# Patient Record
Sex: MALE | Race: WHITE | Employment: OTHER | URBAN - NONMETROPOLITAN AREA
[De-identification: names, ages, dates, MRNs, and addresses within clinical notes are randomized per-mention and may not be internally consistent; named-entity substitution may affect disease eponyms.]

---

## 2017-02-23 ENCOUNTER — PROCEDURE VISIT (OUTPATIENT)
Dept: CARDIOLOGY | Age: 62
End: 2017-02-23

## 2017-02-23 DIAGNOSIS — Z95.0 PACEMAKER: Primary | ICD-10-CM

## 2017-02-23 PROCEDURE — 93294 REM INTERROG EVL PM/LDLS PM: CPT | Performed by: INTERNAL MEDICINE

## 2017-02-23 PROCEDURE — 93296 REM INTERROG EVL PM/IDS: CPT | Performed by: INTERNAL MEDICINE

## 2017-03-13 RX ORDER — VALSARTAN 80 MG/1
TABLET ORAL
Qty: 90 TABLET | Refills: 4 | Status: SHIPPED | OUTPATIENT
Start: 2017-03-13 | End: 2018-03-21 | Stop reason: SDUPTHER

## 2017-05-02 ENCOUNTER — PROCEDURE VISIT (OUTPATIENT)
Dept: CARDIOLOGY | Age: 62
End: 2017-05-02

## 2017-05-02 ENCOUNTER — OFFICE VISIT (OUTPATIENT)
Dept: CARDIOLOGY | Age: 62
End: 2017-05-02

## 2017-05-02 VITALS
WEIGHT: 255.8 LBS | BODY MASS INDEX: 33.9 KG/M2 | DIASTOLIC BLOOD PRESSURE: 82 MMHG | HEIGHT: 73 IN | SYSTOLIC BLOOD PRESSURE: 126 MMHG | HEART RATE: 66 BPM

## 2017-05-02 DIAGNOSIS — Z82.49 FH: MITRAL VALVE REPAIR: ICD-10-CM

## 2017-05-02 DIAGNOSIS — Z95.0 PACEMAKER: ICD-10-CM

## 2017-05-02 DIAGNOSIS — I10 ESSENTIAL HYPERTENSION: Primary | ICD-10-CM

## 2017-05-02 DIAGNOSIS — I48.0 PAROXYSMAL ATRIAL FIBRILLATION (HCC): ICD-10-CM

## 2017-05-02 DIAGNOSIS — Z95.0 PACEMAKER: Primary | ICD-10-CM

## 2017-05-02 PROCEDURE — 1036F TOBACCO NON-USER: CPT | Performed by: NUCLEAR MEDICINE

## 2017-05-02 PROCEDURE — 93280 PM DEVICE PROGR EVAL DUAL: CPT | Performed by: INTERNAL MEDICINE

## 2017-05-02 PROCEDURE — 3017F COLORECTAL CA SCREEN DOC REV: CPT | Performed by: NUCLEAR MEDICINE

## 2017-05-02 PROCEDURE — G8427 DOCREV CUR MEDS BY ELIG CLIN: HCPCS | Performed by: NUCLEAR MEDICINE

## 2017-05-02 PROCEDURE — 99213 OFFICE O/P EST LOW 20 MIN: CPT | Performed by: NUCLEAR MEDICINE

## 2017-05-02 PROCEDURE — G8419 CALC BMI OUT NRM PARAM NOF/U: HCPCS | Performed by: NUCLEAR MEDICINE

## 2017-05-02 RX ORDER — MELOXICAM 15 MG/1
15 TABLET ORAL PRN
COMMUNITY
End: 2018-05-01 | Stop reason: ALTCHOICE

## 2017-08-10 ENCOUNTER — PROCEDURE VISIT (OUTPATIENT)
Dept: CARDIOLOGY CLINIC | Age: 62
End: 2017-08-10
Payer: COMMERCIAL

## 2017-08-10 DIAGNOSIS — Z95.0 PACEMAKER: Primary | ICD-10-CM

## 2017-08-10 PROCEDURE — 93296 REM INTERROG EVL PM/IDS: CPT | Performed by: INTERNAL MEDICINE

## 2017-08-10 PROCEDURE — 93294 REM INTERROG EVL PM/LDLS PM: CPT | Performed by: INTERNAL MEDICINE

## 2017-11-15 ENCOUNTER — PROCEDURE VISIT (OUTPATIENT)
Dept: CARDIOLOGY CLINIC | Age: 62
End: 2017-11-15
Payer: COMMERCIAL

## 2017-11-15 DIAGNOSIS — Z95.0 PACEMAKER: Primary | ICD-10-CM

## 2017-11-15 PROCEDURE — 93294 REM INTERROG EVL PM/LDLS PM: CPT | Performed by: INTERNAL MEDICINE

## 2017-11-15 PROCEDURE — 93296 REM INTERROG EVL PM/IDS: CPT | Performed by: INTERNAL MEDICINE

## 2017-11-15 NOTE — PROGRESS NOTES
DR Jennifer Ortiz PT/PAF/ MG    [employer] DUAL PACEMAKER  BATTERY 6 YRS REMAINING ON DEVICE  ATRIAL IMPEDENCE 557  RV IMPEDENCE 523  P WAVES 7.5  RV WAVES 15.1  A PACED 12%  V PACED 0%   EPISODES OF NS VT  SEVERAL EPISODES OF ATRIAL FAST RATE

## 2018-03-08 ENCOUNTER — TELEPHONE (OUTPATIENT)
Dept: CARDIOLOGY CLINIC | Age: 63
End: 2018-03-08

## 2018-03-09 ENCOUNTER — PROCEDURE VISIT (OUTPATIENT)
Dept: CARDIOLOGY CLINIC | Age: 63
End: 2018-03-09

## 2018-03-09 DIAGNOSIS — Z95.0 PACEMAKER: Primary | ICD-10-CM

## 2018-03-21 RX ORDER — VALSARTAN 80 MG/1
TABLET ORAL
Qty: 90 TABLET | Refills: 0 | Status: SHIPPED | OUTPATIENT
Start: 2018-03-21 | End: 2019-05-07

## 2018-03-21 RX ORDER — FUROSEMIDE 40 MG/1
TABLET ORAL
Qty: 90 TABLET | Refills: 0 | Status: SHIPPED | OUTPATIENT
Start: 2018-03-21 | End: 2018-06-29 | Stop reason: SDUPTHER

## 2018-03-21 RX ORDER — METOPROLOL SUCCINATE 50 MG/1
TABLET, EXTENDED RELEASE ORAL
Qty: 135 TABLET | Refills: 0 | Status: SHIPPED | OUTPATIENT
Start: 2018-03-21 | End: 2018-06-29 | Stop reason: SDUPTHER

## 2018-05-01 ENCOUNTER — OFFICE VISIT (OUTPATIENT)
Dept: CARDIOLOGY CLINIC | Age: 63
End: 2018-05-01
Payer: COMMERCIAL

## 2018-05-01 ENCOUNTER — NURSE ONLY (OUTPATIENT)
Dept: CARDIOLOGY CLINIC | Age: 63
End: 2018-05-01
Payer: COMMERCIAL

## 2018-05-01 VITALS
WEIGHT: 244 LBS | BODY MASS INDEX: 32.34 KG/M2 | DIASTOLIC BLOOD PRESSURE: 82 MMHG | SYSTOLIC BLOOD PRESSURE: 114 MMHG | HEART RATE: 72 BPM | HEIGHT: 73 IN

## 2018-05-01 DIAGNOSIS — I10 ESSENTIAL HYPERTENSION: ICD-10-CM

## 2018-05-01 DIAGNOSIS — Z95.0 PACEMAKER: Primary | ICD-10-CM

## 2018-05-01 DIAGNOSIS — Z98.890 S/P MVR (MITRAL VALVE REPAIR): Primary | ICD-10-CM

## 2018-05-01 DIAGNOSIS — I48.0 PAROXYSMAL ATRIAL FIBRILLATION (HCC): ICD-10-CM

## 2018-05-01 PROCEDURE — 3017F COLORECTAL CA SCREEN DOC REV: CPT | Performed by: NUCLEAR MEDICINE

## 2018-05-01 PROCEDURE — 1036F TOBACCO NON-USER: CPT | Performed by: NUCLEAR MEDICINE

## 2018-05-01 PROCEDURE — 99213 OFFICE O/P EST LOW 20 MIN: CPT | Performed by: NUCLEAR MEDICINE

## 2018-05-01 PROCEDURE — 93280 PM DEVICE PROGR EVAL DUAL: CPT | Performed by: INTERNAL MEDICINE

## 2018-05-01 PROCEDURE — G8417 CALC BMI ABV UP PARAM F/U: HCPCS | Performed by: NUCLEAR MEDICINE

## 2018-05-01 PROCEDURE — G8427 DOCREV CUR MEDS BY ELIG CLIN: HCPCS | Performed by: NUCLEAR MEDICINE

## 2018-05-17 DIAGNOSIS — I10 ESSENTIAL HYPERTENSION: ICD-10-CM

## 2018-05-17 DIAGNOSIS — I48.0 PAROXYSMAL ATRIAL FIBRILLATION (HCC): ICD-10-CM

## 2018-05-17 DIAGNOSIS — Z98.890 S/P MVR (MITRAL VALVE REPAIR): ICD-10-CM

## 2018-06-29 RX ORDER — POTASSIUM CHLORIDE 750 MG/1
TABLET, EXTENDED RELEASE ORAL
Qty: 90 TABLET | Refills: 3 | Status: SHIPPED | OUTPATIENT
Start: 2018-06-29 | End: 2019-09-28 | Stop reason: SDUPTHER

## 2018-06-29 RX ORDER — FUROSEMIDE 40 MG/1
TABLET ORAL
Qty: 90 TABLET | Refills: 3 | Status: SHIPPED | OUTPATIENT
Start: 2018-06-29 | End: 2019-08-07 | Stop reason: SDUPTHER

## 2018-06-29 RX ORDER — METOPROLOL SUCCINATE 50 MG/1
TABLET, EXTENDED RELEASE ORAL
Qty: 135 TABLET | Refills: 3 | Status: SHIPPED | OUTPATIENT
Start: 2018-06-29 | End: 2019-08-07 | Stop reason: SDUPTHER

## 2018-08-02 ENCOUNTER — PROCEDURE VISIT (OUTPATIENT)
Dept: CARDIOLOGY CLINIC | Age: 63
End: 2018-08-02
Payer: COMMERCIAL

## 2018-08-02 DIAGNOSIS — Z95.0 PACEMAKER: Primary | ICD-10-CM

## 2018-08-02 PROCEDURE — 93296 REM INTERROG EVL PM/IDS: CPT | Performed by: INTERNAL MEDICINE

## 2018-08-02 PROCEDURE — 93294 REM INTERROG EVL PM/LDLS PM: CPT | Performed by: INTERNAL MEDICINE

## 2018-08-02 NOTE — PROGRESS NOTES
[employer] dual pacer  Battery 5.5year  Atrial paced 13%  Ventricle paced 1%  p wave 7.2 mV  r wave 14. 1mV  Atrial impedence 515 ohms  Ventricle impedence 502 ohms% AT/AF <1  Episodes  Of Non susustained V Tach  Longest :09

## 2018-11-14 ENCOUNTER — PROCEDURE VISIT (OUTPATIENT)
Dept: CARDIOLOGY CLINIC | Age: 63
End: 2018-11-14
Payer: COMMERCIAL

## 2018-11-14 DIAGNOSIS — Z95.0 PACEMAKER: Primary | ICD-10-CM

## 2018-11-14 PROCEDURE — 93296 REM INTERROG EVL PM/IDS: CPT | Performed by: INTERNAL MEDICINE

## 2018-11-14 PROCEDURE — 93294 REM INTERROG EVL PM/LDLS PM: CPT | Performed by: INTERNAL MEDICINE

## 2019-02-15 ENCOUNTER — PROCEDURE VISIT (OUTPATIENT)
Dept: CARDIOLOGY CLINIC | Age: 64
End: 2019-02-15
Payer: COMMERCIAL

## 2019-02-15 DIAGNOSIS — Z95.0 PACEMAKER: Primary | ICD-10-CM

## 2019-02-15 PROCEDURE — 93294 REM INTERROG EVL PM/LDLS PM: CPT | Performed by: INTERNAL MEDICINE

## 2019-02-15 PROCEDURE — 93296 REM INTERROG EVL PM/IDS: CPT | Performed by: INTERNAL MEDICINE

## 2019-05-07 ENCOUNTER — NURSE ONLY (OUTPATIENT)
Dept: CARDIOLOGY CLINIC | Age: 64
End: 2019-05-07
Payer: COMMERCIAL

## 2019-05-07 ENCOUNTER — OFFICE VISIT (OUTPATIENT)
Dept: CARDIOLOGY CLINIC | Age: 64
End: 2019-05-07
Payer: COMMERCIAL

## 2019-05-07 VITALS
SYSTOLIC BLOOD PRESSURE: 138 MMHG | WEIGHT: 248.6 LBS | DIASTOLIC BLOOD PRESSURE: 86 MMHG | HEIGHT: 73 IN | HEART RATE: 80 BPM | BODY MASS INDEX: 32.95 KG/M2

## 2019-05-07 DIAGNOSIS — I10 ESSENTIAL HYPERTENSION: ICD-10-CM

## 2019-05-07 DIAGNOSIS — Z95.0 PACEMAKER: Primary | ICD-10-CM

## 2019-05-07 DIAGNOSIS — Z98.890 H/O MITRAL VALVE REPAIR: ICD-10-CM

## 2019-05-07 PROCEDURE — 93280 PM DEVICE PROGR EVAL DUAL: CPT | Performed by: INTERNAL MEDICINE

## 2019-05-07 PROCEDURE — G8427 DOCREV CUR MEDS BY ELIG CLIN: HCPCS | Performed by: NUCLEAR MEDICINE

## 2019-05-07 PROCEDURE — 1036F TOBACCO NON-USER: CPT | Performed by: NUCLEAR MEDICINE

## 2019-05-07 PROCEDURE — 99213 OFFICE O/P EST LOW 20 MIN: CPT | Performed by: NUCLEAR MEDICINE

## 2019-05-07 PROCEDURE — G8417 CALC BMI ABV UP PARAM F/U: HCPCS | Performed by: NUCLEAR MEDICINE

## 2019-05-07 PROCEDURE — 3017F COLORECTAL CA SCREEN DOC REV: CPT | Performed by: NUCLEAR MEDICINE

## 2019-05-07 RX ORDER — MELOXICAM 15 MG/1
15 TABLET ORAL DAILY PRN
COMMUNITY
End: 2022-11-02

## 2019-05-07 NOTE — PROGRESS NOTES
Vitamins-Minerals (THERAPEUTIC MULTIVITAMIN-MINERALS) tablet Take 1 tablet by mouth 2 times daily. 60 tablet 1    Tadalafil (CIALIS PO) Take  by mouth as needed. No current facility-administered medications for this visit. Allergies   Allergen Reactions    Penicillins Hives and Swelling     Throat swelling     Health Maintenance   Topic Date Due    Hepatitis C screen  1955    HIV screen  03/24/1970    DTaP/Tdap/Td vaccine (1 - Tdap) 03/24/1974    Shingles Vaccine (1 of 2) 03/24/2005    Colon cancer screen colonoscopy  03/24/2005    Potassium monitoring  04/12/2015    Creatinine monitoring  04/12/2015    Diabetes screen  02/03/2017    Lipid screen  01/22/2019    Flu vaccine (Season Ended) 09/01/2019    Pneumococcal 0-64 years Vaccine  Aged Out       Subjective:  Review of Systems  General:   No fever, no chills, some fatigue or weight loss  Pulmonary:    some dyspnea, no wheezing  Cardiac:    Denies recent chest pain,   GI:     No nausea or vomiting, no abdominal pain  Neuro:     No dizziness or light headedness,   Musculoskeletal:  No recent active issues  Extremities:   No edema, good peripheral pulses      Objective:  Physical Exam  /86   Pulse 80   Ht 6' 1\" (1.854 m)   Wt 248 lb 9.6 oz (112.8 kg)   BMI 32.80 kg/m²   General:   Well developed, well nourished  Lungs:    Clear to auscultation  Heart:    Normal S1 S2, Slight murmur. no rubs, no gallops  Abdomen:   Soft, non tender, no organomegalies, positive bowel sounds  Extremities:   No edema, no cyanosis, good peripheral pulses  Neurological:   Awake, alert, oriented. No obvious focal deficits  Musculoskelatal:  No obvious deformities    Assessment:      Diagnosis Orders   1. Pacemaker     2. H/O mitral valve repair     3. Essential hypertension     cardiac fair for now   No new issues     Plan:  No follow-ups on file.   As above  Continue risk factor modification and medical management  Thank you for allowing me to participate in the care of your patient. Please don't hesitate to contact me regarding any further issues related to the patient care    Orders Placed:  No orders of the defined types were placed in this encounter. Medications Prescribed:  No orders of the defined types were placed in this encounter. Discussed use, benefit, and side effects of prescribed medications. All patient questions answered. Pt voicedunderstanding. Instructed to continue current medications, diet and exercise. Continue risk factor modification and medical management. Patient agreed with treatment plan. Follow up as directed.     Electronically signedby Daisy Baldwin MD on 5/7/2019 at 11:54 AM

## 2019-08-07 RX ORDER — METOPROLOL SUCCINATE 50 MG/1
TABLET, EXTENDED RELEASE ORAL
Qty: 135 TABLET | Refills: 3 | Status: SHIPPED | OUTPATIENT
Start: 2019-08-07 | End: 2020-08-21

## 2019-08-07 RX ORDER — FUROSEMIDE 40 MG/1
TABLET ORAL
Qty: 90 TABLET | Refills: 3 | Status: SHIPPED | OUTPATIENT
Start: 2019-08-07 | End: 2020-08-21

## 2019-08-23 ENCOUNTER — PROCEDURE VISIT (OUTPATIENT)
Dept: CARDIOLOGY CLINIC | Age: 64
End: 2019-08-23
Payer: COMMERCIAL

## 2019-08-23 DIAGNOSIS — Z95.0 PACEMAKER: Primary | ICD-10-CM

## 2019-08-23 PROCEDURE — 93296 REM INTERROG EVL PM/IDS: CPT | Performed by: INTERNAL MEDICINE

## 2019-08-23 PROCEDURE — 93294 REM INTERROG EVL PM/LDLS PM: CPT | Performed by: INTERNAL MEDICINE

## 2019-09-30 RX ORDER — POTASSIUM CHLORIDE 750 MG/1
TABLET, EXTENDED RELEASE ORAL
Qty: 90 TABLET | Refills: 2 | Status: SHIPPED | OUTPATIENT
Start: 2019-09-30 | End: 2021-01-04 | Stop reason: SDUPTHER

## 2019-11-29 ENCOUNTER — PROCEDURE VISIT (OUTPATIENT)
Dept: CARDIOLOGY CLINIC | Age: 64
End: 2019-11-29
Payer: COMMERCIAL

## 2019-11-29 DIAGNOSIS — Z95.0 PACEMAKER: Primary | ICD-10-CM

## 2019-11-29 PROCEDURE — 93294 REM INTERROG EVL PM/LDLS PM: CPT | Performed by: INTERNAL MEDICINE

## 2019-11-29 PROCEDURE — 93296 REM INTERROG EVL PM/IDS: CPT | Performed by: INTERNAL MEDICINE

## 2020-03-06 ENCOUNTER — PROCEDURE VISIT (OUTPATIENT)
Dept: CARDIOLOGY CLINIC | Age: 65
End: 2020-03-06
Payer: COMMERCIAL

## 2020-03-06 PROCEDURE — 93294 REM INTERROG EVL PM/LDLS PM: CPT | Performed by: INTERNAL MEDICINE

## 2020-03-06 PROCEDURE — 93296 REM INTERROG EVL PM/IDS: CPT | Performed by: INTERNAL MEDICINE

## 2020-03-06 NOTE — PROGRESS NOTES
DR Anoop Gutierrez PT/ KNOWN AFIB/ MG   AFIB BURDEN <1%    5 NS VT EPISODES     [employer] DUAL PACEMAKER REMOTE   BATTERY 3.5 YRS REMAINING  ATRIAL IMPEDENCE 598  VENT IMPEDENCE 535  P WAVES 7.3  RV WAVES 16.7  ATRIAL THRESHOLD NOT OBTAINED PER THE DEVICE  VENT THRESHOLD 0.5 @ 0.4 PER THE DEVICE  VENT THRESHOLD PROGRAMMED AUTO    DDDR   A PACED 18%  V PACED 0%

## 2020-06-02 ENCOUNTER — NURSE ONLY (OUTPATIENT)
Dept: CARDIOLOGY CLINIC | Age: 65
End: 2020-06-02
Payer: MEDICARE

## 2020-06-02 ENCOUNTER — OFFICE VISIT (OUTPATIENT)
Dept: CARDIOLOGY CLINIC | Age: 65
End: 2020-06-02
Payer: MEDICARE

## 2020-06-02 VITALS
DIASTOLIC BLOOD PRESSURE: 91 MMHG | WEIGHT: 249.12 LBS | HEIGHT: 73 IN | SYSTOLIC BLOOD PRESSURE: 138 MMHG | HEART RATE: 71 BPM | BODY MASS INDEX: 33.02 KG/M2

## 2020-06-02 PROCEDURE — 99213 OFFICE O/P EST LOW 20 MIN: CPT | Performed by: NUCLEAR MEDICINE

## 2020-06-02 PROCEDURE — 93280 PM DEVICE PROGR EVAL DUAL: CPT | Performed by: INTERNAL MEDICINE

## 2020-06-02 NOTE — PROGRESS NOTES
[address] [place] [address],[id number] [place] [address]  Dept: [phone]  Dept Fax: [phone]  Loc: [phone]    Visit Date: 6/2/2020    Connor Sanchez is a 72 y.o. male who presents todayfor:  Chief Complaint   Patient presents with    Check-Up    Hypertension    Cardiac Valve Problem   know MVR 2013 and pacer  Had intermittent A fib   Has been very quite  No new long episodes  No chest pain  No changes in breathing  BP is stable   Pacer checked today   Very minimal A fib   No longer than few seconds at a time     HPI:  HPI  Past Medical History:   Diagnosis Date    Hypertension     Paroxysmal atrial fibrillation ([address] 11/18/16 under one minute 11/18/2016    SOB (shortness of breath)       Past Surgical History:   Procedure Laterality Date    ANUS SURGERY  2010    abscess drained    C[facility]    COLONOSCOPY      MITRAL VALVE SURGERY  2-5-14    repair    PAC[facility]ER PLACEMENT      [place] [facility]    TONSILLECTOMY       Family History   Problem Relation Age of Onset    Cancer Mother 61    Heart Disease Paternal Grandfather      Social History     Tobacco Use    Smoking status: Never Smoker    Smokeless tobacco: Never Used   Substance Use Topics    Alcohol use: Yes     Alcohol/week: 1.0 standard drinks     Types: 1 Cans of beer per week     Comment: rarely - 1 can of beer/week maximum      Current Outpatient Medications   Medication Sig Dispense Refill    potassium chloride (KLOR-CON M) 10 MEQ extended release tablet TAKE 1 TABLET DAILY 90 tablet 2    furosemide (LASIX) 40 MG tablet TAKE 1 TABLET DAILY AS NEEDED 90 tablet 3    metoprolol succinate (TOPROL XL) 50 MG extended release tablet TAKE ONE AND ONE-HALF TABLETS DAILY 135 tablet 3    meloxicam (MOBIC) 15 MG tablet Take 15 mg by mouth daily as needed       aspirin 325 MG EC tablet Take 1 tablet by mouth daily.  (Patient taking differently: Take 81 mg

## 2020-06-02 NOTE — PROGRESS NOTES
jt twnsp  Here to see Elaine Blackwell also, aware of VT and short at flutter     . Joyce Cain Battery longevity:3.5 years     Atrial impedance 547  RV impedance 527    P wave sensing 8.2  R wave sensing 18.1    18 % atrial paced  <1 % RV paced     Atrial threshold 0.6V  at 0.4ms  RV threshold 0.4V at 0.4ms

## 2020-08-21 RX ORDER — FUROSEMIDE 40 MG/1
TABLET ORAL
Qty: 90 TABLET | Refills: 3 | Status: SHIPPED | OUTPATIENT
Start: 2020-08-21 | End: 2020-11-10 | Stop reason: DRUGHIGH

## 2020-08-21 RX ORDER — METOPROLOL SUCCINATE 50 MG/1
TABLET, EXTENDED RELEASE ORAL
Qty: 135 TABLET | Refills: 3 | Status: SHIPPED | OUTPATIENT
Start: 2020-08-21 | End: 2021-05-04 | Stop reason: SDUPTHER

## 2020-09-09 ENCOUNTER — PROCEDURE VISIT (OUTPATIENT)
Dept: CARDIOLOGY CLINIC | Age: 65
End: 2020-09-09
Payer: MEDICARE

## 2020-09-09 PROCEDURE — 93296 REM INTERROG EVL PM/IDS: CPT | Performed by: NUCLEAR MEDICINE

## 2020-09-09 PROCEDURE — 93294 REM INTERROG EVL PM/LDLS PM: CPT | Performed by: NUCLEAR MEDICINE

## 2020-09-09 NOTE — PROGRESS NOTES
[facility] sci dual pacer     . Ashok Paiz Battery longevity:  3.5 years   Presenting rhythm  AS VS     Atrial impedance 594  RV impedance 562    P wave sensing 7.5  R wave sensing 20.8    22 % atrial paced  0 % RV paced     Atrial threshold not measured   RV threshold 0.5 V at 0.4ms  Mode switches   <1%   Slow at flutter 7/20/200-no [facility]  8/2/20  6 beats VT

## 2020-11-09 ENCOUNTER — TELEPHONE (OUTPATIENT)
Dept: CARDIOLOGY CLINIC | Age: 65
End: 2020-11-09

## 2020-11-09 NOTE — TELEPHONE ENCOUNTER
Patient notified. Lab orders faxed to [facility] at [phone]. Per patient request.   ECHO order given to scheduling.

## 2020-11-10 ENCOUNTER — TELEPHONE (OUTPATIENT)
Dept: CARDIOLOGY CLINIC | Age: 65
End: 2020-11-10

## 2020-11-10 LAB
BUN BLDV-MCNC: 15 MG/DL
CALCIUM SERPL-MCNC: 9.2 MG/DL
CHLORIDE BLD-SCNC: 102 MMOL/L
CO2: 32 MMOL/L
CREAT SERPL-MCNC: 1.45 MG/DL
GFR CALCULATED: 49
GLUCOSE BLD-MCNC: 99 MG/DL
MAGNESIUM: 2 MG/DL
POTASSIUM SERPL-SCNC: 4.3 MMOL/L
SODIUM BLD-SCNC: 142 MMOL/L
TSH SERPL DL<=0.05 MIU/L-ACNC: 1.28 UIU/ML

## 2020-11-10 RX ORDER — FUROSEMIDE 20 MG/1
20 TABLET ORAL DAILY
COMMUNITY
End: 2021-05-04

## 2020-11-10 NOTE — TELEPHONE ENCOUNTER
Patient stated that he is going to be out of town around the time that his ECHO results will be back and requested to be called on his mobile #, [phone]. Echo is scheduled for 11/13/2020 at [address] [place] [address] and patient is aware that the results may not be back until next week.

## 2020-11-10 NOTE — TELEPHONE ENCOUNTER
Echo scheduled at [facility] 11-13-20 @ 1:00pm    Pt notified   Sent to pre-cert dept for auth

## 2020-11-10 NOTE — TELEPHONE ENCOUNTER
Received BMP. Abnormal Creatinine. Patient takes Lasix 40 mg and potassium 10 MEQ daily. Please review. Anything needed?      Component  Value  Ref Range & Units  Status  Resulted  Lab    Sodium  142  mmol/L  Final  11/10/2020 12:00 AM  Unknown    Chloride  102  mmol/L  Final  11/10/2020 12:00 AM  Unknown    Potassium  4.3  mmol/L  Final  11/10/2020 12:00 AM  Unknown    BUN  15  mg/dL  Final  11/10/2020 12:00 AM  Unknown    CREATININE  1.45   Final  11/10/2020 12:00 AM  Unknown    Glucose  99  mg/dL  Final  11/10/2020 12:00 AM  Unknown    CO2  32  mmol/L  Final  11/10/2020 12:00 AM  Unknown    Calcium  9.2  mg/dL  Final  11/10/2020 12:00 AM  Unknown    Gfr Calculated  49   Final  11/10/2020 12:00 AM  Unknown

## 2020-11-17 NOTE — TELEPHONE ENCOUNTER
Called [facility]. Neither hospital have record of pt having an ECHO. LM for pt to return call checking where he had it done.

## 2020-12-22 ENCOUNTER — PROCEDURE VISIT (OUTPATIENT)
Dept: CARDIOLOGY CLINIC | Age: 65
End: 2020-12-22
Payer: MEDICARE

## 2020-12-22 PROCEDURE — 93294 REM INTERROG EVL PM/LDLS PM: CPT | Performed by: NUCLEAR MEDICINE

## 2020-12-22 PROCEDURE — 93296 REM INTERROG EVL PM/IDS: CPT | Performed by: NUCLEAR MEDICINE

## 2020-12-22 NOTE — PROGRESS NOTES
DR Angela Andrade PT Veronique Cooper PAF/  MG  AFIB BURDEN <1%    [employer] DUAL PACEMAKER REMOTE   BATTERY 3.5 YRS REMAINING  ATRIAL IMPEDENCE 571  VENT IMPEDENCE 497  P WAVES 7.5   RV WAVES 17.4  VENT THRESHOLD PER THE DEVICE 0.5  A PACED 20%  V PACED 1%  DDDR   5 EPISODES OF NS VT

## 2021-01-04 RX ORDER — FUROSEMIDE 40 MG/1
20 TABLET ORAL DAILY
Qty: 45 TABLET | Refills: 1 | Status: SHIPPED | OUTPATIENT
Start: 2021-01-04 | End: 2021-06-30

## 2021-01-04 RX ORDER — POTASSIUM CHLORIDE 750 MG/1
TABLET, EXTENDED RELEASE ORAL
Qty: 90 TABLET | Refills: 1 | Status: SHIPPED | OUTPATIENT
Start: 2021-01-04 | End: 2021-06-30

## 2021-01-04 NOTE — TELEPHONE ENCOUNTER
Jonatan Castaneda called requesting a refill on the following medications:  Requested Prescriptions     Pending Prescriptions Disp Refills    furosemide (LASIX) 40 MG tablet 90 tablet 3    potassium chloride (KLOR-CON M) 10 MEQ extended release tablet 90 tablet 2     [facility]  . michael      Date of last visit: 6/2/20  Date of next visit (if applicable): Visit date not found

## 2021-03-31 ENCOUNTER — PROCEDURE VISIT (OUTPATIENT)
Dept: CARDIOLOGY CLINIC | Age: 66
End: 2021-03-31
Payer: MEDICARE

## 2021-03-31 DIAGNOSIS — Z95.0 PACEMAKER: Primary | ICD-10-CM

## 2021-03-31 PROCEDURE — 93296 REM INTERROG EVL PM/IDS: CPT | Performed by: NUCLEAR MEDICINE

## 2021-03-31 PROCEDURE — 93294 REM INTERROG EVL PM/LDLS PM: CPT | Performed by: NUCLEAR MEDICINE

## 2021-03-31 NOTE — PROGRESS NOTES
DR Meenu Simon PT Amrita Gutierrez AFIB/ NO ANTI COAGS  AFIB BURDEN <1%  1 NS VT EPISODE OF VENT TACHYCARDIA ON 11/9/20 FOR 36 SECONDS   1 NS VT EPISODE ON 11/9/20 FOR 5 SECONDS     [employer] DUAL PACEMAKER REMOTE  BATTERY 3 YRS REMAINING  ATRIAL IMPEDENCE 589  VENT IMPEDENCE 554  P WAVES 9  RV WAVES 21.6  NO ATRIAL THRESHOLD OBTAINED PER THE DEVICE  VENT THRESHOLD PER THE DEVICE 0.6 @ 0.4  A PACED 17%  V PACED 0%  DDDR

## 2021-05-04 ENCOUNTER — OFFICE VISIT (OUTPATIENT)
Dept: CARDIOLOGY CLINIC | Age: 66
End: 2021-05-04
Payer: MEDICARE

## 2021-05-04 VITALS
HEIGHT: 73 IN | BODY MASS INDEX: 32.87 KG/M2 | DIASTOLIC BLOOD PRESSURE: 82 MMHG | WEIGHT: 248 LBS | HEART RATE: 72 BPM | SYSTOLIC BLOOD PRESSURE: 142 MMHG

## 2021-05-04 DIAGNOSIS — Z95.0 PACEMAKER: ICD-10-CM

## 2021-05-04 DIAGNOSIS — Z98.890 S/P MVR (MITRAL VALVE REPAIR): Primary | ICD-10-CM

## 2021-05-04 DIAGNOSIS — I10 ESSENTIAL HYPERTENSION: ICD-10-CM

## 2021-05-04 DIAGNOSIS — I47.20 VENTRICULAR TACHYCARDIA: ICD-10-CM

## 2021-05-04 DIAGNOSIS — R06.02 SOB (SHORTNESS OF BREATH): ICD-10-CM

## 2021-05-04 PROCEDURE — 99214 OFFICE O/P EST MOD 30 MIN: CPT | Performed by: NUCLEAR MEDICINE

## 2021-05-04 RX ORDER — METOPROLOL SUCCINATE 50 MG/1
TABLET, EXTENDED RELEASE ORAL
Qty: 135 TABLET | Refills: 3 | Status: SHIPPED | OUTPATIENT
Start: 2021-05-04 | End: 2022-05-04

## 2021-05-04 NOTE — PROGRESS NOTES
[address] [facility]  [address] [facility],[address] [address] [facility]  Dept: [phone]  Dept Fax: [phone]  Loc: [phone]    Visit Date: 5/4/2021    Diana Cosme is a 77 y.o. male who presents todayfor:  Chief Complaint   Patient presents with    1 Year Follow Up    Atrial Fibrillation    Cardiac Valve Problem    Hypertension     Continues to have short A fib episodes of the pacemaker  He is CONCEPCION vasc 2  No NOACs for now  Lately few beats of v tach   Cath in 2013  No major CAD  EF is normal  No dizziness  No syncope  No chest pain   No changes in breathing  No pacer issues       HPI:  HPI  Past Medical History:   Diagnosis Date    Hypertension     Paroxysmal atrial fibrillation ([address] 11/18/16 under one minute 11/18/2016    SOB (shortness of breath)       Past Surgical History:   Procedure Laterality Date    ANUS SURGERY  2010    abscess drained    C[facility]    COLONOSCOPY      MITRAL VALVE SURGERY  2-5-14    repair    PAC[facility]AKER PLACEMENT      [place] [facility]    TONSILLECTOMY       Family History   Problem Relation Age of Onset    Cancer Mother 61    Heart Disease Paternal Grandfather      Social History     Tobacco Use    Smoking status: Never Smoker    Smokeless tobacco: Never Used   Substance Use Topics    Alcohol use:  Yes     Alcohol/week: 1.0 standard drinks     Types: 1 Cans of beer per week     Comment: rarely - 1 can of beer/week maximum      Current Outpatient Medications   Medication Sig Dispense Refill    furosemide (LASIX) 40 MG tablet Take 0.5 tablets by mouth daily 45 tablet 1    potassium chloride (KLOR-CON M) 10 MEQ extended release tablet TAKE 1 TABLET DAILY 90 tablet 1    metoprolol succinate (TOPROL XL) 50 MG extended release tablet TAKE ONE AND ONE-HALF TABLETS DAILY 135 tablet 3    meloxicam (MOBIC) 15 MG tablet Take 15 mg by mouth daily as needed       aspirin 325 MG EC tablet Take 1 tablet by mouth daily. (Patient taking differently: Take 81 mg by mouth daily Patient takes a couple times per week) 30 tablet 11    Multiple Vitamins-Minerals (THERAPEUTIC MULTIVITAMIN-MINERALS) tablet Take 1 tablet by mouth 2 times daily. 60 tablet 1    Tadalafil (CIALIS PO) Take  by mouth as needed. No current facility-administered medications for this visit. Allergies   Allergen Reactions    Penicillins Hives and Swelling     Throat swelling     Health Maintenance   Topic Date Due    Hepatitis C screen  Never done    COVID-19 Vaccine (1) Never done    DTaP/Tdap/Td vaccine (1 - Tdap) Never done    Shingles Vaccine (1 of 2) Never done    Colon cancer screen colonoscopy  Never done    Lipid screen  01/22/2019    Pneumococcal 65+ years Vaccine (1 of 1 - PPSV23) Never done   [employer] Visit (AWV)  Never done    Potassium monitoring  11/10/2021    Creatinine monitoring  11/10/2021    Flu vaccine  Completed    Hepatitis A vaccine  Aged Out    Hepatitis B vaccine  Aged Out    Hib vaccine  Aged Out    Meningococcal (ACWY) vaccine  Aged Out       Subjective:  Review of Systems  General:   No fever, no chills, some fatigue or weight loss  Pulmonary:    some dyspnea, no wheezing  Cardiac:    Denies recent chest pain,   GI:     No nausea or vomiting, no abdominal pain  Neuro:     No dizziness or light headedness,   Musculoskeletal:  No recent active issues  Extremities:   No edema, no obvious claudication       Objective:  Physical Exam  BP (!) 142/82   Pulse 72   Ht 6' 1\" (1.854 m)   Wt 248 lb (112.5 kg)   BMI 32.72 kg/m²   General:   Well developed, well nourished  Lungs:   Clear to auscultation  Heart:    Normal S1 S2, Slight murmur. no rubs, no gallops  Abdomen:   Soft, non tender, no organomegalies, positive bowel sounds  Extremities:   No edema, no cyanosis, good peripheral pulses  Neurological:   Awake, alert, oriented.  No obvious focal deficits  Musculoskelatal:  No obvious

## 2021-05-10 ENCOUNTER — NURSE ONLY (OUTPATIENT)
Dept: CARDIOLOGY CLINIC | Age: 66
End: 2021-05-10

## 2021-05-10 DIAGNOSIS — Z95.0 PACEMAKER: Primary | ICD-10-CM

## 2021-05-10 PROCEDURE — 93294 REM INTERROG EVL PM/LDLS PM: CPT | Performed by: NUCLEAR MEDICINE

## 2021-05-10 PROCEDURE — 93296 REM INTERROG EVL PM/IDS: CPT | Performed by: NUCLEAR MEDICINE

## 2021-05-10 NOTE — PROGRESS NOTES
DR Brewster Ends PT   [employer] DUAL PACEMAKER CHECK IN OFFICE   BATTERY 3 YRS REMAINING  PRESENTS IN [facility] 75  KNOWN AFIB/ WAS GIVEN ELIQUIS/ PT SAID HE HAS NOT STARTED TAKING THIS YET /SAID HE WAS OUT OF TOWN AND WILL START TAKING  AFIB BURDEN <1%    PT HAD 3 EPISODES OF AFIB FOR 1 SECOND EACH  P WAVES 7.2  RV WAVES 16.4  ATRIAL IMEPEDENCE 537  RV IMPEDENCE 507  ATRIAL THREHSOLD 0.5 @ 0.4  VENT THRESHOLD 0.5 @ 0.4    ATRIAL AMPLITUDE 2 @ 0.4  VENT AMPLITUDE AUTO

## 2021-05-11 ENCOUNTER — TELEPHONE (OUTPATIENT)
Dept: CARDIOLOGY CLINIC | Age: 66
End: 2021-05-11

## 2021-05-11 DIAGNOSIS — I47.20 VENTRICULAR TACHYCARDIA: Primary | ICD-10-CM

## 2021-05-11 DIAGNOSIS — I49.3 FREQUENT PVCS: ICD-10-CM

## 2021-05-11 NOTE — TELEPHONE ENCOUNTER
Refer to susan   Patient had very frequent PVCs and couplets on stress test   Had v tach on pacer check   See what he thinks

## 2021-05-12 DIAGNOSIS — Z95.0 PACEMAKER: ICD-10-CM

## 2021-05-12 DIAGNOSIS — I47.20 VENTRICULAR TACHYCARDIA: ICD-10-CM

## 2021-05-12 DIAGNOSIS — Z98.890 S/P MVR (MITRAL VALVE REPAIR): ICD-10-CM

## 2021-05-12 DIAGNOSIS — R06.02 SOB (SHORTNESS OF BREATH): ICD-10-CM

## 2021-05-12 DIAGNOSIS — I10 ESSENTIAL HYPERTENSION: ICD-10-CM

## 2021-05-23 NOTE — PROGRESS NOTES
[facility]  Dept: [phone]         CARDIAC ELECTROPHYSIOLOGY: CONSULTATION NOTE  PATIENT DEMOGRAPHICS:  Date:   5/26/2021  Patient name:              Stanton Montana  YOB: 1955  Sex: male   MRN:   [id number]    PRIMARY CARE PHYSICIAN:   [address] PROVIDER:  Janine Andre MD    REASON FOR CONSULTATION:  PVC and NSVT. HISTORY OF PRESENT ILLNESS:  66/M who presented to Dr. Tali Zamora around a month ago with history of exertional shortness of breath. His echocardiogram showed normal left ventricle size and function. Myocardial perfusion scan was negative for ischemia/infarction. During the stress test he was noted to have premature ventricular complexes and short runs of nonsustained ventricular tachycardia. He was referred here for further management. The patient denies chest pain, orthopnea, proximal nocturnal dyspnea, palpitations, lightheadedness or syncope. He has good functional status. He has known atrial fibrillation primarily detected on his device interrogation, low burden. Is currently anticoagulated with apixaban. Medical hx: PAF (dx 11/2016), SSS s/p DCPM (BS), PVC and NSVT (noted during stress test in 5/2021), HTN, obesity, mild CAD, h/o MV repair, Normal LVEF. REVIEW OF SYSTEMS:    Constitutional: Negative for chills and fever  HENT: Negative for congestion, sinus pressure, sneezing and sore throat. Eyes: Negative for pain, discharge, redness and itching. Respiratory: Negative for apnea, cough  Gastrointestinal: Negative for blood in stool, constipation, diarrhea   Endocrine: Negative for cold intolerance, heat intolerance, polydipsia. Genitourinary: Negative for dysuria, enuresis, flank pain and hematuria. Musculoskeletal: Negative for arthralgias, joint swelling and neck pain. Neurological: Negative for numbness and headaches.    Psychiatric/Behavioral: Negative for agitation, confusion, decreased concentration and dysphoric mood. PAST MEDICAL HISTORY:  PAF (dx 11/2016), SSS s/p DCPM (BS), PVC and NSVT (noted during stress test in 5/2021), HTN, obesity, mild CAD, h/o MV repair, Normal LVEF. Past Medical History:   Diagnosis Date    Hypertension     Paroxysmal atrial fibrillation ([address] 11/18/16 under one minute 11/18/2016    SOB (shortness of breath)        PSH:  Past Surgical History:   Procedure Laterality Date    ANUS SURGERY  2010    abscess drained    [facility]    COLONOSCOPY      MITRAL VALVE SURGERY  2-5-14    repair   [facility]ennett Reaper PACEMAKER PLACEMENT      [place] [facility]    TONSILLECTOMY         FAMILY HISTORY:  Family History   Problem Relation Age of Onset    Cancer Mother 61    Heart Disease Paternal Grandfather         SOCIAL HISTORY:   lives with his wife. He has 2 grownup children. He is semiretired, teaches social science. Active. Denies smoking and drinks alcohol socially. Social History     Socioeconomic History    Marital status:      Spouse name: Not on file    Number of children: Not on file    Years of education: Not on file    Highest education level: Not on file   Occupational History     Employer: [employer]   Tobacco Use    Smoking status: Never Smoker    Smokeless tobacco: Never Used   Vaping Use    Vaping Use: Never used   Substance and Sexual Activity    Alcohol use:  Yes     Alcohol/week: 1.0 standard drinks     Types: 1 Cans of beer per week     Comment: rarely - 1 can of beer/week maximum    Drug use: No    Sexual activity: Not on file   Other Topics Concern    Not on file   Social History Narrative    Not on file     Social Determinants of Health     Financial Resource Strain:     Difficulty of Paying Living Expenses:    Food Insecurity:     Worried About Running Out of Food in the Last Year:     Jamal of Food in the Last Year:    Transportation Needs:     Lack of Transportation (Medical):  Lack of Transportation (Non-Medical):    Physical Activity:     Days of Exercise per Week:     Minutes of Exercise per Session:    Stress:     Feeling of Stress :    Social Connections:     Frequency of Communication with Friends and Family:     Frequency of Social Gatherings with Friends and Family:     Attends Scientology Services:     Active Member of Clubs or Organizations:     Attends Club or Organization Meetings:     Marital Status:    Intimate Partner Violence:     Fear of Current or Ex-Partner:     Emotionally Abused:     Physically Abused:     Sexually Abused: ALLERGY HISTORY:  Allergies   Allergen Reactions    Penicillins Hives and Swelling     Throat swelling        MEDICATIONS:  Current Outpatient Medications   Medication Sig Dispense Refill    apixaban (ELIQUIS) 5 MG TABS tablet Take 1 tablet by mouth 2 times daily 180 tablet 1    metoprolol succinate (TOPROL XL) 50 MG extended release tablet TAKE ONE AND ONE-HALF TABLETS DAILY 135 tablet 3    furosemide (LASIX) 40 MG tablet Take 0.5 tablets by mouth daily 45 tablet 1    potassium chloride (KLOR-CON M) 10 MEQ extended release tablet TAKE 1 TABLET DAILY 90 tablet 1    meloxicam (MOBIC) 15 MG tablet Take 15 mg by mouth daily as needed       aspirin 325 MG EC tablet Take 1 tablet by mouth daily. (Patient taking differently: Take 81 mg by mouth daily Patient takes a couple times per week) 30 tablet 11    Multiple Vitamins-Minerals (THERAPEUTIC MULTIVITAMIN-MINERALS) tablet Take 1 tablet by mouth 2 times daily. 60 tablet 1    Tadalafil (CIALIS PO) Take  by mouth as needed. No current facility-administered medications for this visit. PHYSICAL EXAM:  Vitals:    05/26/21 0925   BP: 138/70   Pulse: Body mass index is 33.8 kg/m². GENERAL: Alert and oriented. No distress. EYES: No pallor or icterus. ENT: No cyanosis. No thyromegaly or cervical LAP.   VESSELS: No jugular venous distension or carotid bruits. HEART: Normal S1/S2. No murmur, rub or gallop. LUNGS: Clear to auscultation. ABDOMEN: Soft and non-tender. EXTREMITIES: No lower extremity edema. Feet are warm. NEUROLOGICAL: Grossly normal.     LABORATORY DATA AND DIAGNOSTIC DATA:  I have personally reviewed and interpreted the results of the following diagnostic testing    Lab Results   Component Value Date    WBC 11.4 (H) 02/12/2014    HGB 13.9 (L) 02/12/2014    HCT 40.8 (L) 02/12/2014     02/12/2014    CHOL 164 01/22/2014    TRIG 101 01/22/2014    HDL 42 01/22/2014    ALT 23 02/03/2014    AST 23 02/03/2014     11/10/2020    K 4.3 11/10/2020     11/10/2020    CREATININE 1.45 11/10/2020    BUN 15 11/10/2020    CO2 32 11/10/2020    TSH 1.28 11/10/2020    INR 1.06 02/11/2014    LABA1C 5.4 02/03/2014     Echocardiogram 11/30/2020: LVEF 55%, Normal PV wall thickness and LV cavity size. ECG Sinus rhythm with incomplete RBBB. Coronary angiogram 2014: Moild CAD. Stress test 15/12/2021: Negative for reversible ischemia. Device interrogation 3/31/2021:  Alfie Montalvo 3 years. ,  paroxysms of atrial flutter (average ventricular rate 77 bpm) with burden <1% and NSVT (longest 36 seconds at 200 bpm), low burden. IMPRESSION:  1. Premature atrial complexes and nonsustained ventricular tachycardia, burden unknown. 2.  PAF, in sinus rhythm. [place] <1%. [id number] score= 2 (Age and HTN)  3. SSS s/p DCPM (BS)  4. HTN, controlled  5. Mild CAD, Normal LVEF.   6. Obesity, BMI 33 kg/m². ASSESSMENT AND RECOMMENDATIONS:  The patient was incidentally diagnosed to have low-grade proximal atrial fibrillation and noted to have PVCs/NSVT during stress test.  He is completely asymptomatic. He has preserved left ventricle size and function. Negative stress test.  He is currently on metoprolol and will continue that. Continue anticoagulation with apixaban for stroke prevention.   I discussed the diagnosis and management with the patient. Since he is asymptomatic we will watch him at this time. We will see him back in 3 months with a 48-hour Holter monitor to assess his PVC burden. Thank you for allowing me to participate in the care of your patient. Please call me if you have any questions. **This report has been created using voice recognition software. It may contain minor errors which are inherent in voice recognition technology. **       Xochitl Bo MD, MRCP, [facility], [facility] on 5/26/2021 at 10:11 AM

## 2021-05-26 ENCOUNTER — OFFICE VISIT (OUTPATIENT)
Dept: CARDIOLOGY CLINIC | Age: 66
End: 2021-05-26
Payer: MEDICARE

## 2021-05-26 ENCOUNTER — NURSE ONLY (OUTPATIENT)
Dept: CARDIOLOGY CLINIC | Age: 66
End: 2021-05-26
Payer: MEDICARE

## 2021-05-26 VITALS
SYSTOLIC BLOOD PRESSURE: 138 MMHG | BODY MASS INDEX: 33.95 KG/M2 | WEIGHT: 256.2 LBS | HEIGHT: 73 IN | HEART RATE: 64 BPM | DIASTOLIC BLOOD PRESSURE: 70 MMHG

## 2021-05-26 DIAGNOSIS — Z95.0 PACEMAKER: Primary | ICD-10-CM

## 2021-05-26 DIAGNOSIS — I47.20 VENTRICULAR TACHYCARDIA: Primary | ICD-10-CM

## 2021-05-26 PROCEDURE — 99204 OFFICE O/P NEW MOD 45 MIN: CPT | Performed by: INTERNAL MEDICINE

## 2021-05-26 PROCEDURE — 93288 INTERROG EVL PM/LDLS PM IP: CPT | Performed by: INTERNAL MEDICINE

## 2021-05-26 PROCEDURE — 93000 ELECTROCARDIOGRAM COMPLETE: CPT | Performed by: INTERNAL MEDICINE

## 2021-05-26 NOTE — PROGRESS NOTES
[facility] sci dual pacer with Hakim appt     4/19/21 4 beats VT   . Candie Key Battery longevity:  3 years on device   Presenting rhythm  AS VS     Atrial impedance 581  RV impedance 511    P wave sensing 8.3  R wave sensing 15.7    18 % atrial paced  0 % RV paced     Atrial threshold 1.1 V  at 0.4ms  RV threshold 0.4 V at 0.4ms  Mode switches   12, all under 1 minute

## 2021-06-30 RX ORDER — POTASSIUM CHLORIDE 750 MG/1
TABLET, EXTENDED RELEASE ORAL
Qty: 90 TABLET | Refills: 1 | Status: SHIPPED | OUTPATIENT
Start: 2021-06-30 | End: 2021-11-22

## 2021-06-30 RX ORDER — FUROSEMIDE 40 MG/1
TABLET ORAL
Qty: 45 TABLET | Refills: 1 | Status: SHIPPED | OUTPATIENT
Start: 2021-06-30 | End: 2021-11-22

## 2021-09-15 ENCOUNTER — NURSE ONLY (OUTPATIENT)
Dept: CARDIOLOGY CLINIC | Age: 66
End: 2021-09-15
Payer: MEDICARE

## 2021-09-15 ENCOUNTER — OFFICE VISIT (OUTPATIENT)
Dept: CARDIOLOGY CLINIC | Age: 66
End: 2021-09-15
Payer: MEDICARE

## 2021-09-15 VITALS
DIASTOLIC BLOOD PRESSURE: 85 MMHG | SYSTOLIC BLOOD PRESSURE: 133 MMHG | HEART RATE: 75 BPM | BODY MASS INDEX: 32.37 KG/M2 | HEIGHT: 73 IN | WEIGHT: 244.2 LBS

## 2021-09-15 DIAGNOSIS — Z95.0 PACEMAKER: Primary | ICD-10-CM

## 2021-09-15 DIAGNOSIS — I48.0 PAROXYSMAL ATRIAL FIBRILLATION (HCC): ICD-10-CM

## 2021-09-15 DIAGNOSIS — I34.0 MITRAL VALVE INSUFFICIENCY, UNSPECIFIED ETIOLOGY: Primary | ICD-10-CM

## 2021-09-15 PROCEDURE — 93288 INTERROG EVL PM/LDLS PM IP: CPT | Performed by: INTERNAL MEDICINE

## 2021-09-15 PROCEDURE — 99214 OFFICE O/P EST MOD 30 MIN: CPT | Performed by: INTERNAL MEDICINE

## 2021-09-15 PROCEDURE — 93000 ELECTROCARDIOGRAM COMPLETE: CPT | Performed by: INTERNAL MEDICINE

## 2021-09-15 RX ORDER — CYCLOBENZAPRINE HCL 10 MG
10 TABLET ORAL 3 TIMES DAILY PRN
COMMUNITY
Start: 2021-01-15 | End: 2022-11-02

## 2021-09-15 NOTE — PROGRESS NOTES
[address] [facility]  Dept: [phone]         CARDIAC ELECTROPHYSIOLOGY: CONSULTATION NOTE  PATIENT DEMOGRAPHICS:  Date:   9/15/2021  Patient name:              Mary Barboza  YOB: 1955  Sex: male   MRN:   [id number]    PRIMARY CARE PHYSICIAN:   [address] PROVIDER:  Lashonda Becerra MD    REASON FOR CONSULTATION:  PVC and NSVT. HISTORY OF PRESENT ILLNESS:  66/M who presented to Dr. Radha Maynard around a month ago with history of exertional shortness of breath. His echocardiogram showed normal left ventricle size and function. Myocardial perfusion scan was negative for ischemia/infarction. During the stress test he was noted to have premature ventricular complexes and short runs of nonsustained ventricular tachycardia. I saw him on 5/26/2021 and further Holter monitor from 9/7/2021 showed isolated PVCs (1093) and isolated PACs (150s). Is here for follow-up visit. No complaints. Denies palpitations, chest pain, shortness of breath dizziness or lower extremity swelling. He is tolerating metoprolol well. Medical hx: PAF (dx 11/2016), SSS s/p DCPM (BS), Low burden PVC and NSVT noted during stress test in 5/2021 (1093), HTN, obesity, mild CAD, h/o MV repair, Normal LVEF. REVIEW OF SYSTEMS:    Constitutional: Negative for chills and fever  HENT: Negative for congestion, sinus pressure, sneezing and sore throat. Eyes: Negative for pain, discharge, redness and itching. Respiratory: Negative for apnea, cough  Gastrointestinal: Negative for blood in stool, constipation, diarrhea   Endocrine: Negative for cold intolerance, heat intolerance, polydipsia. Genitourinary: Negative for dysuria, enuresis, flank pain and hematuria. Musculoskeletal: Negative for arthralgias, joint swelling and neck pain. Neurological: Negative for numbness and headaches.    Psychiatric/Behavioral: Negative for agitation, confusion, decreased concentration and dysphoric mood. PAST MEDICAL HISTORY:  PAF (dx 11/2016), SSS s/p DCPM (BS), PVC and NSVT (noted during stress test in 5/2021), HTN, obesity, mild CAD, h/o MV repair, Normal LVEF. Past Medical History:   Diagnosis Date    Hypertension     Paroxysmal atrial fibrillation ([address] 11/18/16 under one minute 11/18/2016    SOB (shortness of breath)        PSH:  Past Surgical History:   Procedure Laterality Date    ANUS SURGERY  2010    abscess drained    CARD[facility]AC [facility]HETERIZ[facility]  [facility]/22/14    [facility]    COLONOSCOPY      MITRAL VALVE SURGERY  2-5-14    repair   [facility] PACEMAKER PLACEMENT      [facility]    TONSILLECTOMY         FAMILY HISTORY:  Family History   Problem Relation Age of Onset    Cancer Mother 61    Heart Disease Paternal Grandfather         SOCIAL HISTORY:   lives with his wife. He has 2 grownup children. He is semiretired, teaches social science. Active. Denies smoking and drinks alcohol socially. Social History     Socioeconomic History    Marital status:      Spouse name: Not on file    Number of children: Not on file    Years of education: Not on file    Highest education level: Not on file   Occupational History     Employer: [employer]   Tobacco Use    Smoking status: Never Smoker    Smokeless tobacco: Never Used   Vaping Use    Vaping Use: Never used   Substance and Sexual Activity    Alcohol use:  Yes     Alcohol/week: 1.0 standard drinks     Types: 1 Cans of beer per week     Comment: rarely - 1 can of beer/week maximum    Drug use: No    Sexual activity: Not on file   Other Topics Concern    Not on file   Social History Narrative    Not on file     Social Determinants of Health     Financial Resource Strain:     Difficulty of Paying Living Expenses:    Food Insecurity:     Worried About Running Out of Food in the Last Year:     Jamal of Food in the Last Year:    Transportation Needs:     Lack of Transportation (Medical):  Lack of Transportation (Non-Medical):    Physical Activity:     Days of Exercise per Week:     Minutes of Exercise per Session:    Stress:     Feeling of Stress :    Social Connections:     Frequency of Communication with Friends and Family:     Frequency of Social Gatherings with Friends and Family:     Attends Yarsani Services:     Active Member of Clubs or Organizations:     Attends Club or Organization Meetings:     Marital Status:    Intimate Partner Violence:     Fear of Current or Ex-Partner:     Emotionally Abused:     Physically Abused:     Sexually Abused: ALLERGY HISTORY:  Allergies   Allergen Reactions    Penicillins Hives and Swelling     Throat swelling        MEDICATIONS:  Current Outpatient Medications   Medication Sig Dispense Refill    potassium chloride (KLOR-CON M) 10 MEQ extended release tablet TAKE 1 TABLET DAILY 90 tablet 1    furosemide (LASIX) 40 MG tablet TAKE ONE-HALF (1/2) TABLET DAILY 45 tablet 1    apixaban (ELIQUIS) 5 MG TABS tablet Take 1 tablet by mouth 2 times daily 180 tablet 1    metoprolol succinate (TOPROL XL) 50 MG extended release tablet TAKE ONE AND ONE-HALF TABLETS DAILY 135 tablet 3    meloxicam (MOBIC) 15 MG tablet Take 15 mg by mouth daily as needed       aspirin 325 MG EC tablet Take 1 tablet by mouth daily. (Patient taking differently: Take 81 mg by mouth daily Patient takes a couple times per week) 30 tablet 11    Multiple Vitamins-Minerals (THERAPEUTIC MULTIVITAMIN-MINERALS) tablet Take 1 tablet by mouth 2 times daily. 60 tablet 1    Tadalafil (CIALIS PO) Take  by mouth as needed. No current facility-administered medications for this visit. PHYSICAL EXAM:  Vitals:    09/15/21 1044   BP: 133/85   Pulse: 75     Body mass index is 32.22 kg/m². GENERAL: Alert and oriented. No distress. EYES: No pallor or icterus. ENT: No cyanosis. No thyromegaly or cervical LAP.   VESSELS: No jugular venous distension or carotid bruits. HEART: Normal S1/S2. No murmur, rub or gallop. LUNGS: Clear to auscultation. CHEST WALL: No erosions, discharge or swelling at device site. ABDOMEN: Soft and non-tender. EXTREMITIES: No lower extremity edema. Feet are warm. NEUROLOGICAL: Grossly normal.     LABORATORY DATA AND DIAGNOSTIC DATA:  I have personally reviewed and interpreted the results of the following diagnostic testing    Lab Results   Component Value Date    WBC 11.4 (H) 02/12/2014    HGB 13.9 (L) 02/12/2014    HCT 40.8 (L) 02/12/2014     02/12/2014    CHOL 164 01/22/2014    TRIG 101 01/22/2014    HDL 42 01/22/2014    ALT 23 02/03/2014    AST 23 02/03/2014     11/10/2020    K 4.3 11/10/2020     11/10/2020    CREATININE 1.45 11/10/2020    BUN 15 11/10/2020    CO2 32 11/10/2020    TSH 1.28 11/10/2020    INR 1.06 02/11/2014    LABA1C 5.4 02/03/2014     Echocardiogram 11/30/2020: LVEF 55%, Normal PV wall thickness and LV cavity size. ECG Sinus rhythm with incomplete RBBB. Coronary angiogram 2014: Moild CAD. Stress test 15/12/2021: Negative for reversible ischemia. Device interrogation 9/15/2021:  [employer] DCPM: Longevity 3 years. ,  Stable electrical parameters. Paroxysms of atrial flutter (average ventricular rate 77 bpm) with burden <0.1%. Holter monitor 9/6/2021: PACs and 52 and PVCs 1393. No ventricular tachycardia. IMPRESSION:  1. Idiopathic premature ventricular complexes (low burden. Normal LVEF and negative stress test  2. PAF, in sinus rhythm. [place] <0.1%. [id number] score= 2 (Age and HTN) on Apixaban. 3.  SSS s/p DCPM (BS)  4. HTN, controlled  5. Mild CAD, Normal LVEF.   6. Obesity, BMI 33 kg/m². ASSESSMENT AND RECOMMENDATIONS:  His PVC burden is low. He does not report palpitations. He is tolerating small dose of metoprolol well. We will continue monitoring him every 6 months with a Holter monitor.   If anytime he becomes symptomatic from PVCs/atrial flutter

## 2021-09-15 NOTE — PROGRESS NOTES
Pt questioning if Lasix is supposed to be 20 mg daily or 40mg. He has been taking a full 40 mg. He has been taking it irregularly, not daily due to traveling. Taking the Potassium irreg.  also(Takes on the days he takes Lasix)    Denies chest pain, sob, dizziness, palpitations    C/o swelling t/o body when he doesn't take the Lasix

## 2021-09-15 NOTE — PROGRESS NOTES
camelia sci dual pacer in office w/ Sarahi     . Dorothy Money Battery longevity:  3 years on device   467.3K PVC's     Atrial impedance 613  RV impedance 523    P wave sensing 7.2  R wave sensing 14.3    27 % atrial paced  2 % RV paced     Atrial threshold 1.2 V  at 0.4ms  RV threshold 0.5 V at 0.4ms  Mode switches   1/eliquis

## 2021-09-30 ENCOUNTER — PROCEDURE VISIT (OUTPATIENT)
Dept: CARDIOLOGY CLINIC | Age: 66
End: 2021-09-30
Payer: MEDICARE

## 2021-09-30 DIAGNOSIS — Z95.0 PACEMAKER: Primary | ICD-10-CM

## 2021-09-30 PROCEDURE — 93294 REM INTERROG EVL PM/LDLS PM: CPT | Performed by: NUCLEAR MEDICINE

## 2021-09-30 PROCEDURE — 93296 REM INTERROG EVL PM/IDS: CPT | Performed by: NUCLEAR MEDICINE

## 2021-11-22 RX ORDER — FUROSEMIDE 40 MG/1
TABLET ORAL
Qty: 45 TABLET | Refills: 0 | Status: SHIPPED | OUTPATIENT
Start: 2021-11-22 | End: 2021-12-07

## 2021-11-22 RX ORDER — POTASSIUM CHLORIDE 750 MG/1
TABLET, EXTENDED RELEASE ORAL
Qty: 90 TABLET | Refills: 0 | Status: SHIPPED | OUTPATIENT
Start: 2021-11-22

## 2021-12-07 ENCOUNTER — OFFICE VISIT (OUTPATIENT)
Dept: CARDIOLOGY CLINIC | Age: 66
End: 2021-12-07
Payer: MEDICARE

## 2021-12-07 VITALS
HEIGHT: 73 IN | WEIGHT: 244.8 LBS | SYSTOLIC BLOOD PRESSURE: 146 MMHG | BODY MASS INDEX: 32.44 KG/M2 | DIASTOLIC BLOOD PRESSURE: 90 MMHG | HEART RATE: 76 BPM

## 2021-12-07 DIAGNOSIS — I10 PRIMARY HYPERTENSION: Primary | ICD-10-CM

## 2021-12-07 DIAGNOSIS — I48.0 PAROXYSMAL ATRIAL FIBRILLATION (HCC): ICD-10-CM

## 2021-12-07 PROCEDURE — 99213 OFFICE O/P EST LOW 20 MIN: CPT | Performed by: NUCLEAR MEDICINE

## 2021-12-07 RX ORDER — BUMETANIDE 1 MG/1
1 TABLET ORAL DAILY
COMMUNITY
End: 2021-12-07 | Stop reason: SDUPTHER

## 2021-12-07 RX ORDER — BUMETANIDE 1 MG/1
1 TABLET ORAL DAILY
Qty: 90 TABLET | Refills: 3 | Status: SHIPPED | OUTPATIENT
Start: 2021-12-07

## 2021-12-07 NOTE — PROGRESS NOTES
[address] [address] [address] [facility] Floor [address] [facility]  Dept: [phone]  Dept Fax: [phone]  Loc: [phone]    Visit Date: 12/7/2021    Magali Corona is a 77 y.o. male who presents todayfor:  Chief Complaint   Patient presents with    Check-Up    Atrial Fibrillation    Hypertension    Cardiac Valve Problem     Know MV repair  Pacer is followed  A fib is stable  Seeing susan for that   Medical Rx for now  No bleeding   No new issues  Doing well   BP is stable   Higher today       HPI:  HPI  Past Medical History:   Diagnosis Date    Hypertension     Paroxysmal atrial fibrillation ([address] 11/18/16 under one minute 11/18/2016    SOB (shortness of breath)       Past Surgical History:   Procedure Laterality Date    ANUS SURGERY  2010    abscess drained    [facility]    COLONOSCOPY      MITRAL VALVE SURGERY  2-5-14    repair    PACEMAKER PLACEMENT      [facility]    TONSILLECTOMY       Family History   Problem Relation Age of Onset    Cancer Mother 61    Heart Disease Paternal Grandfather      Social History     Tobacco Use    Smoking status: Never Smoker    Smokeless tobacco: Never Used   Substance Use Topics    Alcohol use:  Yes     Alcohol/week: 1.0 standard drink     Types: 1 Cans of beer per week     Comment: rarely - 1 can of beer/week maximum      Current Outpatient Medications   Medication Sig Dispense Refill    potassium chloride (KLOR-CON M) 10 MEQ extended release tablet TAKE 1 TABLET DAILY 90 tablet 0    furosemide (LASIX) 40 MG tablet TAKE ONE-HALF (1/2) TABLET DAILY 45 tablet 0    cyclobenzaprine (FLEXERIL) 10 MG tablet Take 10 mg by mouth 3 times daily as needed      apixaban (ELIQUIS) 5 MG TABS tablet Take 1 tablet by mouth 2 times daily 180 tablet 1    metoprolol succinate (TOPROL XL) 50 MG extended release tablet TAKE ONE AND ONE-HALF TABLETS DAILY 135 tablet 3    meloxicam (MOBIC) 15 MG tablet Take 15 mg by mouth daily as needed       aspirin 325 MG EC tablet Take 1 tablet by mouth daily. (Patient taking differently: Take 81 mg by mouth daily Patient takes a couple times per week) 30 tablet 11    Multiple Vitamins-Minerals (THERAPEUTIC MULTIVITAMIN-MINERALS) tablet Take 1 tablet by mouth 2 times daily. 60 tablet 1     No current facility-administered medications for this visit. Allergies   Allergen Reactions    Penicillins Hives and Swelling     Throat swelling     Health Maintenance   Topic Date Due    Hepatitis C screen  Never done    COVID-19 Vaccine (1) Never done    Colon cancer screen colonoscopy  Never done    DTaP/Tdap/Td vaccine (1 - Tdap) 07/19/2002    Shingles Vaccine (1 of 2) Never done    Lipid screen  01/22/2019    Annual Wellness Visit (AWV)  Never done    Potassium monitoring  11/10/2021    Creatinine monitoring  11/10/2021    Pneumococcal 65+ years Vaccine (2 of 2 - PPSV23) 06/17/2022    Flu vaccine  Completed    Hepatitis A vaccine  Aged Out    Hepatitis B vaccine  Aged Out    Hib vaccine  Aged Out    Meningococcal (ACWY) vaccine  Aged Out       Subjective:  Review of Systems  General:   No fever, no chills, No fatigue or weight loss  Pulmonary:    No dyspnea, no wheezing  Cardiac:    Denies recent chest pain,   GI:     No nausea or vomiting, no abdominal pain  Neuro:    No dizziness or light headedness,   Musculoskeletal:  No recent active issues  Extremities:   No edema, no obvious claudication       Objective:  Physical Exam  BP (!) 146/90   Pulse 76   Ht 6' 1\" (1.854 m)   Wt 244 lb 12.8 oz (111 kg)   BMI 32.30 kg/m²   General:   Well developed, well nourished  Lungs:   Clear to auscultation  Heart:    Normal S1 S2, Slight murmur. no rubs, no gallops  Abdomen:   Soft, non tender, no organomegalies, positive bowel sounds  Extremities:   No edema, no cyanosis, good peripheral pulses  Neurological:   Awake, alert, oriented.  No obvious focal deficits  Musculoskelatal:  No obvious deformities    Assessment:      Diagnosis Orders   1. Primary hypertension     2. Paroxysmal atrial fibrillation (HCC)     as above  Cardiac fair for now   Issues with lasix   Plan:  No follow-ups on file. As above  Change to bumex 1 mg   Continue risk factor modification and medical management  Thank you for allowing me to participate in the care of your patient. Please don't hesitate to contact me regarding any further issues related to the patient care    Orders Placed:  No orders of the defined types were placed in this encounter. Medications Prescribed:  No orders of the defined types were placed in this encounter. Discussed use, benefit, and side effects of prescribed medications. All patient questions answered. Pt voicedunderstanding. Instructed to continue current medications, diet and exercise. Continue risk factor modification and medical management. Patient agreed with treatment plan. Follow up as directed.     Electronically signedby Katlin Camacho MD on 12/7/2021 at 12:50 PM

## 2021-12-07 NOTE — PATIENT INSTRUCTIONS
Discontinue Furosemide (Lasix)    Start taking Bumetanide (Bumex) 1mg: Take one tablet by mouth once a day

## 2022-01-13 ENCOUNTER — PROCEDURE VISIT (OUTPATIENT)
Dept: CARDIOLOGY CLINIC | Age: 67
End: 2022-01-13
Payer: MEDICARE

## 2022-01-13 DIAGNOSIS — Z95.0 PACEMAKER: Primary | ICD-10-CM

## 2022-01-13 PROCEDURE — 93294 REM INTERROG EVL PM/LDLS PM: CPT | Performed by: NUCLEAR MEDICINE

## 2022-01-13 PROCEDURE — 93296 REM INTERROG EVL PM/IDS: CPT | Performed by: NUCLEAR MEDICINE

## 2022-01-13 NOTE — PROGRESS NOTES
DR Edith Fernandez PT/ KNOWN PAF/  ELIQUIS   AFIB BURDEN <1%    [employer]XT [employer] DUAL PACEMAKER REMOTE   BATTERY 2.5 YRS REMAINING    ATRIAL IMPEDENCE 634  VENT IMPEDENCE 524  P WAVES 8.8  RV WAVES 15.9    VENT THRESHOLD PER THE DEVICE 0.6 @ 0.4    A PACED 23%  V PACED 1%    DDDR       11-30-21 4 BTS NS VT   11/17/21 7 BTS NS VT   10/20/21 1 SECOND OF AFIB

## 2022-02-09 RX ORDER — APIXABAN 5 MG/1
TABLET, FILM COATED ORAL
Qty: 180 TABLET | Refills: 3 | Status: SHIPPED | OUTPATIENT
Start: 2022-02-09

## 2022-03-30 ENCOUNTER — NURSE ONLY (OUTPATIENT)
Dept: CARDIOLOGY CLINIC | Age: 67
End: 2022-03-30
Payer: MEDICARE

## 2022-03-30 ENCOUNTER — OFFICE VISIT (OUTPATIENT)
Dept: CARDIOLOGY CLINIC | Age: 67
End: 2022-03-30
Payer: MEDICARE

## 2022-03-30 VITALS
HEART RATE: 68 BPM | WEIGHT: 252.2 LBS | SYSTOLIC BLOOD PRESSURE: 124 MMHG | BODY MASS INDEX: 33.43 KG/M2 | HEIGHT: 73 IN | DIASTOLIC BLOOD PRESSURE: 88 MMHG

## 2022-03-30 DIAGNOSIS — I48.0 PAROXYSMAL ATRIAL FIBRILLATION (HCC): Primary | ICD-10-CM

## 2022-03-30 DIAGNOSIS — Z95.0 PACEMAKER: Primary | ICD-10-CM

## 2022-03-30 PROCEDURE — 93280 PM DEVICE PROGR EVAL DUAL: CPT | Performed by: INTERNAL MEDICINE

## 2022-03-30 PROCEDURE — 99214 OFFICE O/P EST MOD 30 MIN: CPT | Performed by: INTERNAL MEDICINE

## 2022-03-30 NOTE — PROGRESS NOTES
[address] [facility]  Dept: [phone]    CARDIAC ELECTROPHYSIOLOGY: FOLLOW UP NOTE  PATIENT DEMOGRAPHICS:  Date:   3/30/2022  Patient name:              Gilson Del Cid  YOB: 1955  Sex: male   MRN:   [id number]    PRIMARY CARE PHYSICIAN:   Suman Martínez    CARDIOLOGIST:  Matthew Garcia MD    REASON FOR CONSULTATION:  PVC and NSVT. HISTORY OF PRESENT ILLNESS:  66/M with exertional shortness of breath, isolated premature atrial and premature ventricular complexes, normal echocardiogram and negative myocardial perfusion scan on metoprolol is here for follow-up visit. Continues to have mild exertional shortness of breath, stable. No new complaints. No 9alpitation, chest pain, orthopnea or lower extremity swelling Holter monitor from 3/3/2022: Showed SVT PVCs (1671) and aspirate ventricular complexes. Medical hx: PAF (dx 11/2016), SSS s/p DCPM (BS), Low burden PVC and NSVT noted during stress test in 5/2021 (1093), HTN, obesity, mild CAD, h/o MV repair, Normal LVEF. REVIEW OF SYSTEMS:    Constitutional: Negative for chills and fever  HENT: Negative for congestion, sinus pressure, sneezing and sore throat. Eyes: Negative for pain, discharge, redness and itching. Respiratory: Negative for apnea, cough  Gastrointestinal: Negative for blood in stool, constipation, diarrhea   Endocrine: Negative for cold intolerance, heat intolerance, polydipsia. Genitourinary: Negative for dysuria, enuresis, flank pain and hematuria. Musculoskeletal: Negative for arthralgias, joint swelling and neck pain. Neurological: Negative for numbness and headaches. Psychiatric/Behavioral: Negative for agitation, confusion, decreased concentration and dysphoric mood.       PAST MEDICAL HISTORY:  PAF (dx 11/2016), SSS s/p DCPM (BS), PVC and NSVT (noted during stress test in 5/2021), HTN, obesity, mild CAD, h/o MV repair, Normal LVEF.     Past Medical History:   Diagnosis Date    Hypertension     Paroxysmal atrial fibrillation ([address] 11/18/16 under one minute 11/18/2016    SOB (shortness of breath)        PSH:  Past Surgical History:   Procedure Laterality Date    ANUS SURGERY  2010    abscess drained    CARDIAC [address]HETERIZATION  1/22/14    [address]    COLONOSCOPY      MITRAL VALVE SURGERY  2-5-14    repair   [facility] PACEMAKER PLACEMENT      [place] [facility]    TONSILLECTOMY         FAMILY HISTORY:  Family History   Problem Relation Age of Onset    Cancer Mother 61    Heart Disease Paternal Grandfather         SOCIAL HISTORY:   lives with his wife. He has 2 grownup children. He is semiretired, teaches social science. Active. Denies smoking and drinks alcohol socially. Social History     Socioeconomic History    Marital status:      Spouse name: Not on file    Number of children: Not on file    Years of education: Not on file    Highest education level: Not on file   Occupational History     Employer: [employer]   Tobacco Use    Smoking status: Never Smoker    Smokeless tobacco: Never Used   Vaping Use    Vaping Use: Never used   Substance and Sexual Activity    Alcohol use: Yes     Alcohol/week: 1.0 standard drink     Types: 1 Cans of beer per week     Comment: rarely - 1 can of beer/week maximum    Drug use: No    Sexual activity: Not on file   Other Topics Concern    Not on file   Social History Narrative    Not on file     Social Determinants of Health     Financial Resource Strain:     Difficulty of Paying Living Expenses: Not on file   Food Insecurity:     Worried About Running Out of Food in the Last Year: Not on file    Jamal of Food in the Last Year: Not on file   Transportation Needs:     Lack of Transportation (Medical): Not on file    Lack of Transportation (Non-Medical):  Not on file   Physical Activity:     Days of Exercise per Week: Not on file    Minutes of Exercise per Session: Not on file   Stress:     Feeling of Stress : Not on file   Social Connections:     Frequency of Communication with Friends and Family: Not on file    Frequency of Social Gatherings with Friends and Family: Not on file    Attends Anabaptism Services: Not on file    Active Member of Clubs or Organizations: Not on file    Attends Club or Organization Meetings: Not on file    Marital Status: Not on file   Intimate Partner Violence:     Fear of Current or Ex-Partner: Not on file    Emotionally Abused: Not on file    Physically Abused: Not on file    Sexually Abused: Not on file   Housing Stability:     Unable to Pay for Housing in the Last Year: Not on file    Number of [place] in the Last Year: Not on file    Unstable Housing in the Last Year: Not on file        ALLERGY HISTORY:  Allergies   Allergen Reactions    Penicillins Hives and Swelling     Throat swelling        MEDICATIONS:  Current Outpatient Medications   Medication Sig Dispense Refill    ELIQUIS 5 MG TABS tablet TAKE 1 TABLET TWICE A  tablet 3    bumetanide (BUMEX) 1 MG tablet Take 1 tablet by mouth daily 90 tablet 3    potassium chloride (KLOR-CON M) 10 MEQ extended release tablet TAKE 1 TABLET DAILY 90 tablet 0    cyclobenzaprine (FLEXERIL) 10 MG tablet Take 10 mg by mouth 3 times daily as needed      metoprolol succinate (TOPROL XL) 50 MG extended release tablet TAKE ONE AND ONE-HALF TABLETS DAILY 135 tablet 3    meloxicam (MOBIC) 15 MG tablet Take 15 mg by mouth daily as needed       aspirin 325 MG EC tablet Take 1 tablet by mouth daily. (Patient taking differently: Take 81 mg by mouth daily Patient takes a couple times per week) 30 tablet 11    Multiple Vitamins-Minerals (THERAPEUTIC MULTIVITAMIN-MINERALS) tablet Take 1 tablet by mouth 2 times daily. 60 tablet 1     No current facility-administered medications for this visit.        PHYSICAL EXAM:  Vitals:    03/30/22 1000   BP: 124/88   Pulse: 68     Body mass index is 33.27 kg/m². GENERAL: Alert and oriented. No distress. EYES: No pallor or icterus. ENT: No cyanosis. No thyromegaly or cervical LAP. VESSELS: No jugular venous distension or carotid bruits. HEART: Normal S1/S2. No murmur, rub or gallop. LUNGS: Clear to auscultation. CHEST WALL: No erosions, discharge or swelling at device site. ABDOMEN: Soft and non-tender. EXTREMITIES: No lower extremity edema. Feet are warm. NEUROLOGICAL: Grossly normal.     LABORATORY DATA AND DIAGNOSTIC DATA:  I have personally reviewed and interpreted the results of the following diagnostic testing    Lab Results   Component Value Date    WBC 11.4 (H) 02/12/2014    HGB 13.9 (L) 02/12/2014    HCT 40.8 (L) 02/12/2014     02/12/2014    CHOL 164 01/22/2014    TRIG 101 01/22/2014    HDL 42 01/22/2014    ALT 23 02/03/2014    AST 23 02/03/2014     11/10/2020    K 4.3 11/10/2020     11/10/2020    CREATININE 1.45 11/10/2020    BUN 15 11/10/2020    CO2 32 11/10/2020    TSH 1.28 11/10/2020    INR 1.06 02/11/2014    LABA1C 5.4 02/03/2014     Echocardiogram 11/30/2020: LVEF 55%, Normal PV wall thickness and LV cavity size. ECG Sinus rhythm with incomplete RBBB. Coronary angiogram 2014: Moild CAD. Stress test 15/12/2021: Negative for reversible ischemia. Device interrogation 9/15/2021:  [employer] DCPM: Longevity 3 years. ,  Stable electrical parameters. Paroxysms of atrial flutter (average ventricular rate 77 bpm) with burden <0.1%. Holter monitor 9/6/2021: PACs 52 and PVCs 1393. No ventricular tachycardia. Holter monitor 3/20/2020: Isolated PVCs (1670) and isolated PACs. No sustained arrhythmias. IMPRESSION:  · Low burden idiopathic PVCs. Normal LVEF and negative stress test  · PAF, in sinus rhythm. [place] <0.1%. [id number] score= 2 (Age and HTN) on Apixaban. · SSS s/p DCPM (BS)  · HTN, controlled  · Mild CAD. · Obesity, BMI 33 kg/m².     ASSESSMENT AND RECOMMENDATIONS:  The patient does not report palpitation. His shortness of breath and cardiovascular conditioning obesity. His PVC and atrial fibrillation burden has remained unchanged (low). Tolerating metoprolol. Continue exam for stroke prevention. Follow-up in 4 months. Thank you for allowing me to participate in the care of your patient. Please call me if you have any questions. **This report has been created using voice recognition software. It may contain minor errors which are inherent in voice recognition technology. **     Sanam Fisher MD, MRCP, [facility], [facility] on 3/30/2022 at 10:22 AM

## 2022-03-30 NOTE — PROGRESS NOTES
camelia sci dual pacer w/ susan appt   467.3K PVC's     . [employer]de [employer] Battery longevity:  2  Presenting rhythm  AS VS     Atrial impedance 632  RV impedance 566    P wave sensing 9  R wave sensing 16    22 % atrial paced  <1 % RV paced     Atrial threshold 1.2 V  at 0.4ms  RV threshold 0.6 V at 0.4ms  Mode switches   <1

## 2022-03-30 NOTE — PROGRESS NOTES
EKG Obtained  Device interrogation  6 mo f/u    Patient admits to    [x] SOB- no more than normal-  Exertion only  [x] Light headed/ dizziness-  Rarely      Patient admits to history of   [x] Event/ holter monitor-  48 hr holter

## 2022-05-04 RX ORDER — METOPROLOL SUCCINATE 50 MG/1
TABLET, EXTENDED RELEASE ORAL
Qty: 135 TABLET | Refills: 3 | Status: SHIPPED | OUTPATIENT
Start: 2022-05-04

## 2022-07-07 ENCOUNTER — PROCEDURE VISIT (OUTPATIENT)
Dept: CARDIOLOGY CLINIC | Age: 67
End: 2022-07-07
Payer: MEDICARE

## 2022-07-07 DIAGNOSIS — Z95.0 PACEMAKER: Primary | ICD-10-CM

## 2022-07-07 PROCEDURE — 93296 REM INTERROG EVL PM/IDS: CPT | Performed by: NUCLEAR MEDICINE

## 2022-07-07 PROCEDURE — 93294 REM INTERROG EVL PM/LDLS PM: CPT | Performed by: NUCLEAR MEDICINE

## 2022-07-07 NOTE — PROGRESS NOTES
[facility] dual pacer     . Regla Whyte Battery longevity:  2 years   Presenting rhythm  AP VS     Atrial impedance 590  RV impedance 513    P wave sensing 7.8  R wave sensing 15.9    22 % atrial paced  1 % RV paced     Atrial threshold not measured   RV threshold 0.6 V at 0.4ms  Mode switches   1 under 1 minute

## 2022-10-13 ENCOUNTER — PROCEDURE VISIT (OUTPATIENT)
Dept: CARDIOLOGY CLINIC | Age: 67
End: 2022-10-13
Payer: MEDICARE

## 2022-10-13 DIAGNOSIS — Z95.0 PACEMAKER: Primary | ICD-10-CM

## 2022-10-13 PROCEDURE — 93294 REM INTERROG EVL PM/LDLS PM: CPT | Performed by: NUCLEAR MEDICINE

## 2022-10-13 PROCEDURE — 93296 REM INTERROG EVL PM/IDS: CPT | Performed by: NUCLEAR MEDICINE

## 2022-10-13 NOTE — PROGRESS NOTES
Remote [employer] Dual Pacemaker --has  [place] at home  Patient of [facility]    Battery 1.5 years    Presenting rhythm AS VS    A Impedance 595  RV Impedance 517    P wave sensing 8.1  R wave sensing 21.4    A Threshold - @ -  RV Thresholds 0.6 @ 0.40      A Paced 23%  V Paced 1%    Programmed Mode DDDR       Afib [place] <1%    Episodes   9/5/22- NS VT rate 143

## 2022-11-02 ENCOUNTER — OFFICE VISIT (OUTPATIENT)
Dept: ONCOLOGY | Age: 67
End: 2022-11-02
Payer: MEDICARE

## 2022-11-02 ENCOUNTER — HOSPITAL ENCOUNTER (OUTPATIENT)
Dept: INFUSION THERAPY | Age: 67
Discharge: HOME | End: 2022-11-02
Payer: MEDICARE

## 2022-11-02 VITALS
HEART RATE: 82 BPM | TEMPERATURE: 98.4 F | RESPIRATION RATE: 16 BRPM | DIASTOLIC BLOOD PRESSURE: 101 MMHG | SYSTOLIC BLOOD PRESSURE: 157 MMHG

## 2022-11-02 VITALS
SYSTOLIC BLOOD PRESSURE: 157 MMHG | DIASTOLIC BLOOD PRESSURE: 101 MMHG | HEIGHT: 73 IN | BODY MASS INDEX: 32.87 KG/M2 | HEART RATE: 82 BPM | OXYGEN SATURATION: 96 % | TEMPERATURE: 98.4 F | WEIGHT: 248 LBS | RESPIRATION RATE: 16 BRPM

## 2022-11-02 DIAGNOSIS — R94.31 ABNORMAL ELECTROCARDIOGRAM (ECG) (EKG): ICD-10-CM

## 2022-11-02 DIAGNOSIS — Z11.59 ENCOUNTER FOR SCREENING FOR OTHER VIRAL DISEASES: ICD-10-CM

## 2022-11-02 DIAGNOSIS — C83.10 MANTLE CELL LYMPHOMA, UNSPECIFIED BODY REGION (HCC): ICD-10-CM

## 2022-11-02 DIAGNOSIS — C83.18 MANTLE CELL LYMPHOMA, LYMPH NODES OF MULTIPLE SITES (HCC): ICD-10-CM

## 2022-11-02 DIAGNOSIS — C83.10 MANTLE CELL LYMPHOMA, UNSPECIFIED BODY REGION (HCC): Primary | ICD-10-CM

## 2022-11-02 LAB
ABSOLUTE IMMATURE GRANULOCYTE: 0.02 THOU/MM3 (ref 0–0.07)
BASINOPHIL, AUTOMATED: 1 % (ref 0–3)
BASOPHILS ABSOLUTE: 0 THOU/MM3 (ref 0–0.1)
BUN, WHOLE BLOOD: 18 MG/DL (ref 8–26)
CHLORIDE, WHOLE BLOOD: 103 MEQ/L (ref 98–109)
CREATININE, WHOLE BLOOD: 1.1 MG/DL (ref 0.5–1.2)
EOSINOPHILS ABSOLUTE: 0.2 THOU/MM3 (ref 0–0.4)
EOSINOPHILS RELATIVE PERCENT: 2 % (ref 0–4)
GFR, ESTIMATED ,CON: > 60 ML/MIN/1.73M2
GLUCOSE, WHOLE BLOOD: 87 MG/DL (ref 70–108)
HCT VFR BLD CALC: 50.5 % (ref 42–52)
HEMOGLOBIN: 17.4 GM/DL (ref 14–18)
IMMATURE GRANULOCYTES: 0 %
IONIZED CALCIUM, WHOLE BLOOD: 1.23 MMOL/L (ref 1.12–1.32)
LYMPHOCYTES # BLD: 17 % (ref 15–47)
LYMPHOCYTES ABSOLUTE: 1.5 THOU/MM3 (ref 1–4.8)
MCH RBC QN AUTO: 32.3 PG (ref 26–33)
MCHC RBC AUTO-ENTMCNC: 34.5 GM/DL (ref 32.2–35.5)
MCV RBC AUTO: 94 FL (ref 80–94)
MONOCYTES ABSOLUTE: 0.8 THOU/MM3 (ref 0.4–1.3)
MONOCYTES: 9 % (ref 0–12)
PDW BLD-RTO: 12.4 % (ref 11.5–14.5)
PLATELET # BLD: 192 THOU/MM3 (ref 130–400)
PMV BLD AUTO: 10.6 FL (ref 9.4–12.4)
POTASSIUM, WHOLE BLOOD: 4.1 MEQ/L (ref 3.5–4.9)
RBC # BLD: 5.38 MILL/MM3 (ref 4.7–6.1)
SEG NEUTROPHILS: 71 % (ref 43–75)
SEGMENTED NEUTROPHILS ABSOLUTE COUNT: 6.2 THOU/MM3 (ref 1.8–7.7)
SODIUM, WHOLE BLOOD: 143 MEQ/L (ref 138–146)
TOTAL CO2, WHOLE BLOOD: 32 MEQ/L (ref 23–33)
WBC # BLD: 8.8 THOU/MM3 (ref 4.8–10.8)

## 2022-11-02 PROCEDURE — 87340 HEPATITIS B SURFACE AG IA: CPT

## 2022-11-02 PROCEDURE — 84550 ASSAY OF BLOOD/URIC ACID: CPT

## 2022-11-02 PROCEDURE — 86704 HEP B CORE ANTIBODY TOTAL: CPT

## 2022-11-02 PROCEDURE — 1123F ACP DISCUSS/DSCN MKR DOCD: CPT | Performed by: INTERNAL MEDICINE

## 2022-11-02 PROCEDURE — 80047 BASIC METABLC PNL IONIZED CA: CPT

## 2022-11-02 PROCEDURE — 83615 LACTATE (LD) (LDH) ENZYME: CPT

## 2022-11-02 PROCEDURE — 80076 HEPATIC FUNCTION PANEL: CPT

## 2022-11-02 PROCEDURE — 99205 OFFICE O/P NEW HI 60 MIN: CPT | Performed by: INTERNAL MEDICINE

## 2022-11-02 PROCEDURE — 99211 OFF/OP EST MAY X REQ PHY/QHP: CPT

## 2022-11-02 PROCEDURE — 85025 COMPLETE CBC W/AUTO DIFF WBC: CPT

## 2022-11-02 RX ORDER — OXYCODONE AND ACETAMINOPHEN 7.5; 325 MG/1; MG/1
1 TABLET ORAL EVERY 8 HOURS PRN
COMMUNITY
End: 2022-11-18

## 2022-11-02 RX ORDER — GABAPENTIN 300 MG/1
300 CAPSULE ORAL PRN
COMMUNITY

## 2022-11-02 NOTE — PROGRESS NOTES
Cardiac catheterization (01/22/2014); Mitral valve surgery (02/05/2014); pacemaker placement (02/05/2014); and bronchoscopy (09/2022). Home Medications  has a current medication list which includes the following prescription(s): oxycodone-acetaminophen, gabapentin, ibuprofen, metoprolol succinate, eliquis, bumetanide, potassium chloride, aspirin, and therapeutic multivitamin-minerals. Allergies  Allergies   Allergen Reactions    Penicillins Hives and Swelling     Throat swelling       Social History   reports that he has never smoked. He has never used smokeless tobacco. He reports current alcohol use of about 1.0 standard drink per week. He reports that he does not use drugs. Family History  family history includes Cancer in his maternal grandmother; Cancer (age of onset: 61) in his mother; Heart Disease in his paternal grandfather; Other in his brother. Labs: Reviewed    Physical Exam  Vitals:    11/02/22 1402   BP: (!) 157/101   Pulse: 82   Resp: 16   Temp: 98.4 °F (36.9 °C)   SpO2: 96%      General: Non-ill appearing. HEENT: NC/AT,nonicteric, perrla,eom intact, no mucosal lesions  Neck: normal thyroid, no masses  Nodes: No adenopathy  Lungs/chest: clear, no rales,rhonchi or wheezing, lung bases clear  CV: rrr, no rubs ,gallops or murmurs  Abdomen: soft, non-tender,bowel sounds normal , no palpable hepatosplenomegaly  Back: normal curvature, No midline tenderness. flanks nontender  : Not Examined  Extremities: no cyanosis,clubbing or edema. Skin: unremarkable  Neuro: A and O x 4, CN exam nonfocal, Motor- no deficits, Sensory- no deficits, gait-nl, speech- fluent, no ataxia. Assessment/Plan:   Mantle cell lymphoma  Essential hypertension  Paroxysmal atrial fibrillation  Mitral valve insufficiency  Status post pacemaker    Medical records, pathology results reviewed at great length. We discussed natural history, course of disease, expected outcomes, potential treatment options.   Also discussed management outlined per NCCN    We agreed to proceed with:  CBC CMP LDH uric acid hepatitis studies  Echo  PET needs to be updated  BM biopsy - staging  Referral to [facility]          Patient Instructions   ECHO   PET - ASAP / urgent  BM Biopsy by IR - ASAP / urgent  Refer to [facility] - for newly diagnosed mantle cell lymphoma  Labs today  RTC  1-2 weeks      Linden Rojo MD  11/2/2022    **This report has been created using voice recognition software. It may contain minor errors which are inherent in voice recognition technology. **

## 2022-11-02 NOTE — PATIENT INSTRUCTIONS
ECHO   PET - ASAP / urgent  BM Biopsy by IR - ASAP / urgent  Refer to [facility] - for newly diagnosed mantle cell lymphoma  Labs today  RTC  1-2 weeks

## 2022-11-03 LAB
ALBUMIN SERPL-MCNC: 4.3 G/DL (ref 3.5–5.1)
ALP BLD-CCNC: 65 U/L (ref 38–126)
ALT SERPL-CCNC: 22 U/L (ref 11–66)
AST SERPL-CCNC: 24 U/L (ref 5–40)
BILIRUB SERPL-MCNC: 0.5 MG/DL (ref 0.3–1.2)
BILIRUBIN DIRECT: < 0.2 MG/DL (ref 0–0.3)
HEPATITIS B CORE TOTAL ANTIBODY: NONREACTIVE
HEPATITIS B SURFACE ANTIGEN: NEGATIVE
LD: 312 U/L (ref 100–190)
TOTAL PROTEIN: 7.6 G/DL (ref 6.1–8)
URIC ACID: 5.8 MG/DL (ref 3.7–7)

## 2022-11-04 ENCOUNTER — HOSPITAL ENCOUNTER (OUTPATIENT)
Dept: NON INVASIVE DIAGNOSTICS | Age: 67
Discharge: HOME | End: 2022-11-04
Payer: MEDICARE

## 2022-11-04 DIAGNOSIS — R94.31 ABNORMAL ELECTROCARDIOGRAM (ECG) (EKG): ICD-10-CM

## 2022-11-04 DIAGNOSIS — C83.10 MANTLE CELL LYMPHOMA, UNSPECIFIED BODY REGION (HCC): ICD-10-CM

## 2022-11-04 LAB
LV EF: 60 %
LVEF MODALITY: NORMAL

## 2022-11-04 PROCEDURE — 93356 MYOCRD STRAIN IMG SPCKL TRCK: CPT

## 2022-11-04 PROCEDURE — 93306 TTE W/DOPPLER COMPLETE: CPT

## 2022-11-17 ENCOUNTER — HOSPITAL ENCOUNTER (OUTPATIENT)
Dept: PET IMAGING | Age: 67
Discharge: HOME | End: 2022-11-17
Payer: MEDICARE

## 2022-11-17 DIAGNOSIS — C83.10 MANTLE CELL LYMPHOMA, UNSPECIFIED BODY REGION (HCC): ICD-10-CM

## 2022-11-17 DIAGNOSIS — C83.18 MANTLE CELL LYMPHOMA, LYMPH NODES OF MULTIPLE SITES (HCC): ICD-10-CM

## 2022-11-17 PROCEDURE — A9552 F18 FDG: HCPCS | Performed by: INTERNAL MEDICINE

## 2022-11-17 PROCEDURE — 3430000000 HC RX DIAGNOSTIC RADIOPHARMACEUTICAL: Performed by: INTERNAL MEDICINE

## 2022-11-17 PROCEDURE — 78815 PET IMAGE W/CT SKULL-THIGH: CPT

## 2022-11-17 RX ORDER — FLUDEOXYGLUCOSE F 18 200 MCI/ML
12.2 INJECTION, SOLUTION INTRAVENOUS
Status: COMPLETED | OUTPATIENT
Start: 2022-11-17 | End: 2022-11-17

## 2022-11-17 RX ADMIN — FLUDEOXYGLUCOSE F 18 12.2 MILLICURIE: 200 INJECTION, SOLUTION INTRAVENOUS at 08:52

## 2022-11-18 ENCOUNTER — HOSPITAL ENCOUNTER (OUTPATIENT)
Dept: CT IMAGING | Age: 67
Discharge: HOME | End: 2022-11-18
Payer: MEDICARE

## 2022-11-18 VITALS
TEMPERATURE: 96.8 F | OXYGEN SATURATION: 93 % | HEART RATE: 61 BPM | RESPIRATION RATE: 16 BRPM | SYSTOLIC BLOOD PRESSURE: 120 MMHG | DIASTOLIC BLOOD PRESSURE: 65 MMHG

## 2022-11-18 LAB
CREAT SERPL-MCNC: 0.9 MG/DL (ref 0.4–1.2)
ERYTHROCYTE [DISTWIDTH] IN BLOOD BY AUTOMATED COUNT: 12.3 % (ref 11.5–14.5)
ERYTHROCYTE [DISTWIDTH] IN BLOOD BY AUTOMATED COUNT: 42.5 FL (ref 35–45)
GFR SERPL CREATININE-BSD FRML MDRD: > 60 ML/MIN/1.73M2
HCT VFR BLD CALC: 48.5 % (ref 42–52)
HEMOGLOBIN: 16.9 GM/DL (ref 14–18)
MCH RBC QN AUTO: 32.9 PG (ref 26–33)
MCHC RBC AUTO-ENTMCNC: 34.8 GM/DL (ref 32.2–35.5)
MCV RBC AUTO: 94.4 FL (ref 80–94)
PLATELET # BLD: 169 THOU/MM3 (ref 130–400)
PMV BLD AUTO: 10.9 FL (ref 9.4–12.4)
RBC # BLD: 5.14 MILL/MM3 (ref 4.7–6.1)
WBC # BLD: 7 THOU/MM3 (ref 4.8–10.8)

## 2022-11-18 PROCEDURE — 88305 TISSUE EXAM BY PATHOLOGIST: CPT

## 2022-11-18 PROCEDURE — 38221 DX BONE MARROW BIOPSIES: CPT

## 2022-11-18 PROCEDURE — 85027 COMPLETE CBC AUTOMATED: CPT

## 2022-11-18 PROCEDURE — 36415 COLL VENOUS BLD VENIPUNCTURE: CPT

## 2022-11-18 PROCEDURE — 6370000000 HC RX 637 (ALT 250 FOR IP): Performed by: RADIOLOGY

## 2022-11-18 PROCEDURE — 82565 ASSAY OF CREATININE: CPT

## 2022-11-18 PROCEDURE — 2580000003 HC RX 258: Performed by: RADIOLOGY

## 2022-11-18 PROCEDURE — 6360000002 HC RX W HCPCS: Performed by: RADIOLOGY

## 2022-11-18 PROCEDURE — 88185 FLOWCYTOMETRY/TC ADD-ON: CPT

## 2022-11-18 PROCEDURE — 88184 FLOWCYTOMETRY/ TC 1 MARKER: CPT

## 2022-11-18 PROCEDURE — 88313 SPECIAL STAINS GROUP 2: CPT

## 2022-11-18 PROCEDURE — 77012 CT SCAN FOR NEEDLE BIOPSY: CPT

## 2022-11-18 RX ORDER — FENTANYL CITRATE 50 UG/ML
50 INJECTION, SOLUTION INTRAMUSCULAR; INTRAVENOUS ONCE
Status: DISCONTINUED | OUTPATIENT
Start: 2022-11-18 | End: 2022-11-19 | Stop reason: HOSPADM

## 2022-11-18 RX ORDER — FENTANYL CITRATE 50 UG/ML
INJECTION, SOLUTION INTRAMUSCULAR; INTRAVENOUS
Status: COMPLETED | OUTPATIENT
Start: 2022-11-18 | End: 2022-11-18

## 2022-11-18 RX ORDER — SODIUM CHLORIDE 450 MG/100ML
INJECTION, SOLUTION INTRAVENOUS CONTINUOUS
Status: DISCONTINUED | OUTPATIENT
Start: 2022-11-18 | End: 2022-11-19 | Stop reason: HOSPADM

## 2022-11-18 RX ORDER — MIDAZOLAM HYDROCHLORIDE 1 MG/ML
INJECTION INTRAMUSCULAR; INTRAVENOUS
Status: COMPLETED | OUTPATIENT
Start: 2022-11-18 | End: 2022-11-18

## 2022-11-18 RX ORDER — HYDROCODONE BITARTRATE AND ACETAMINOPHEN 7.5; 325 MG/1; MG/1
1 TABLET ORAL EVERY 6 HOURS PRN
COMMUNITY

## 2022-11-18 RX ORDER — MIDAZOLAM HYDROCHLORIDE 1 MG/ML
1 INJECTION INTRAMUSCULAR; INTRAVENOUS ONCE
Status: DISCONTINUED | OUTPATIENT
Start: 2022-11-18 | End: 2022-11-19 | Stop reason: HOSPADM

## 2022-11-18 RX ORDER — IBUPROFEN 200 MG
TABLET ORAL
Status: COMPLETED | OUTPATIENT
Start: 2022-11-18 | End: 2022-11-18

## 2022-11-18 RX ADMIN — FENTANYL CITRATE 25 MCG: 50 INJECTION, SOLUTION INTRAMUSCULAR; INTRAVENOUS at 09:00

## 2022-11-18 RX ADMIN — MIDAZOLAM 0.5 MG: 1 INJECTION INTRAMUSCULAR; INTRAVENOUS at 09:00

## 2022-11-18 RX ADMIN — BACITRACIN, NEOMYCIN, POLYMYXIN B 0.9 G: 400; 3.5; 5 OINTMENT TOPICAL at 09:08

## 2022-11-18 RX ADMIN — FENTANYL CITRATE 25 MCG: 50 INJECTION, SOLUTION INTRAMUSCULAR; INTRAVENOUS at 08:52

## 2022-11-18 RX ADMIN — MIDAZOLAM 0.5 MG: 1 INJECTION INTRAMUSCULAR; INTRAVENOUS at 08:52

## 2022-11-18 RX ADMIN — SODIUM CHLORIDE: 4.5 INJECTION, SOLUTION INTRAVENOUS at 07:55

## 2022-11-18 ASSESSMENT — PAIN DESCRIPTION - LOCATION: LOCATION: SHOULDER

## 2022-11-18 ASSESSMENT — PAIN SCALES - GENERAL: PAINLEVEL_OUTOF10: 2

## 2022-11-18 ASSESSMENT — PAIN DESCRIPTION - DESCRIPTORS: DESCRIPTORS: ACHING;DISCOMFORT

## 2022-11-18 ASSESSMENT — PAIN DESCRIPTION - ORIENTATION: ORIENTATION: RIGHT

## 2022-11-18 NOTE — PROGRESS NOTES
0700- patient arrived to unit ambulatory with wife at bedside. Vital signs stable. Patient confirmed he's been off his blood thinners for 1 week and has been NPO since midnight. IV started and lab work sent per order. PT RIGHTS AND RESPONSIBILITIES OFFERED TO PT.   0825- patient to specials for bone biopsy with wife. 5752- patent arrived back from bone biopsy. Vitals stable. Biopsy site clean, dry, intact. Patient denies pain. Patient asking about diet order. 1000- Vitals stable. Band-aid clean, dry, intact. Patient up to restroom with no issues. 1030- patient provided with lunch box and drink per request.   1100- This RN called Dr John Owusu to update him on patient and stated patient will be discharged at [phone]. 1115- Vital signs stable. Band-aid site clean, dry, intact. Discharge instructions reviewed with wife and patient. Verbalized understanding. Patient discharged via wheelchair.                           __M__ Safety:       (Environmental)  Brandon to environment  Ensure ID band is correct and in place/ allergy band as needed  Assess for fall risk  Initiate fall precautions as applicable (fall band, side rails, etc.)  Call light within reach  Bed in low position/ wheels locked    __M__ Pain:       Assess pain level and characteristics  Administer analgesics as ordered  Assess effectiveness of pain management and report to MD as needed    __M__ Knowledge Deficit:  Assess baseline knowledge  Provide teaching at level of understanding  Provide teaching via preferred learning method  Evaluate teaching effectiveness    __M__ Hemodynamic/Respiratory Status:       (Pre and Post Procedure Monitoring)  Assess/Monitor vital signs and LOC  Assess Baseline SpO2 prior to any sedation  Obtain weight/height  Assess vital signs/ LOC until patient meets discharge criteria  Monitor procedure site and notify MD of any issues/ dressing changes per protocol)

## 2022-11-18 NOTE — H&P
[facility]  Sedation/Analgesia History & Physical    Pt Name: Ave Delgado  MRN: [id number]  YOB: 1955  Provider Performing Procedure: Richard Ornelas MD, MD  Primary Care Physician: Kelsey Rose    Formulation and discussion of sedation / procedure plans, risks, benefits, side effects and alternatives with patient and/or responsible adult completed. PRE-PROCEDURE   DNR-CCA/DNR-CC []Yes [x]No  Brief History/Pre-Procedure Diagnosis: History of lymphoma          MEDICAL HISTORY  []CAD/Valve  []Liver Disease  []Lung Disease []Diabetes  []Hypertension []Renal Disease  []Additional information:       has a past medical history of Hypertension, Paroxysmal atrial fibrillation ([facility] [address] 11/18/16 under one minute, and SOB (shortness of breath). SURGICAL HISTORY   has a past surgical history that includes Tonsillectomy; Colonoscopy; Anus surgery (01/01/2010); Cardiac catheterization (01/22/2014); Mitral valve surgery (02/05/2014); pacemaker placement (02/05/2014); bronchoscopy (09/2022); Lung biopsy; and Shoulder biopsy. Additional information:       ALLERGIES   Allergies as of 11/18/2022 - Fully Reviewed 11/18/2022   Allergen Reaction Noted    Penicillins Hives and Swelling 01/10/2014     Additional information:       MEDICATIONS   Coumadin Use Last 5 Days [x]No []Yes  Antiplatelet drug therapy use last 5 days  [x]No []Yes  Other anticoagulant use last 5 days  [x]No []Yes    Current Outpatient Medications:     HYDROcodone-acetaminophen (NORCO) 7.5-325 MG per tablet, Take 1 tablet by mouth every 6 hours as needed for Pain., Disp: , Rfl:     gabapentin (NEURONTIN) 300 MG capsule, Take 300 mg by mouth as needed. , Disp: , Rfl:     IBUPROFEN PO, Take by mouth, Disp: , Rfl:     metoprolol succinate (TOPROL XL) 50 MG extended release tablet, TAKE ONE AND ONE-HALF TABLETS DAILY, Disp: 135 tablet, Rfl: 3    ELIQUIS 5 MG TABS tablet, TAKE 1 TABLET TWICE A DAY, Disp: 180 tablet, Rfl: 3 bumetanide (BUMEX) 1 MG tablet, Take 1 tablet by mouth daily, Disp: 90 tablet, Rfl: 3    potassium chloride (KLOR-CON M) 10 MEQ extended release tablet, TAKE 1 TABLET DAILY, Disp: 90 tablet, Rfl: 0    aspirin 325 MG EC tablet, Take 1 tablet by mouth daily. , Disp: 30 tablet, Rfl: 11    Multiple Vitamins-Minerals (THERAPEUTIC MULTIVITAMIN-MINERALS) tablet, Take 1 tablet by mouth 2 times daily. , Disp: 60 tablet, Rfl: 1    Current Facility-Administered Medications:     0.45 % sodium chloride infusion, , IntraVENous, Continuous, Kendra Pool MD, Last Rate: 20 mL/hr at 11/18/22 0755, New Bag at 11/18/22 0755    fentaNYL (SUBLIMAZE) injection 50 mcg, 50 mcg, IntraVENous, Once, Kendra Pool MD    midazolam (VERSED) injection 1 mg, 1 mg, IntraVENous, Once, Kendra Pool MD  Prior to Admission medications    Medication Sig Start Date End Date Taking? Authorizing Provider   HYDROcodone-acetaminophen (NORCO) 7.5-325 MG per tablet Take 1 tablet by mouth every 6 hours as needed for Pain. Yes Historical Provider, MD   gabapentin (NEURONTIN) 300 MG capsule Take 300 mg by mouth as needed. Historical Provider, MD   IBUPROFEN PO Take by mouth    Historical Provider, MD   metoprolol succinate (TOPROL XL) 50 MG extended release tablet TAKE ONE AND ONE-HALF TABLETS DAILY 5/4/22   Zoheir Tish Hodgkin, MD   ELIQUIS 5 MG TABS tablet TAKE 1 TABLET TWICE A DAY 2/9/22   Zoheir Tish Hodgkin, MD   bumetanide (BUMEX) 1 MG tablet Take 1 tablet by mouth daily 12/7/21   Kwame England MD   potassium chloride (KLOR-CON M) 10 MEQ extended release tablet TAKE 1 TABLET DAILY 11/22/21   Kwame England MD   aspirin 325 MG EC tablet Take 1 tablet by mouth daily. 2/13/14   J Carlos Mckee MD   Multiple Vitamins-Minerals (THERAPEUTIC MULTIVITAMIN-MINERALS) tablet Take 1 tablet by mouth 2 times daily.  2/13/14   J Carlos Mckee MD     Additional information:       VITAL SIGNS   Vitals:    11/18/22 0708   BP: (!) 146/87   Pulse: 63 Resp: 16   Temp: 96.8 °F (36 °C)   SpO2: 96%       PHYSICAL:   Heart:  [x]Regular rate and rhythm  []Other:    Lungs:  [x]Clear    []Other:    Abdomen: [x]Soft    []Other:    Mental Status: [x]Alert & Oriented  []Other:      PLANNED PROCEDURE   [x]Biospy []Arteriogram              []Drainage   []Mediport Insertion  []Fistulogram []IV access       []Vertebroplasty / Augmentation  []IVC filter []Dialysis catheter []Biliary drainage  []Other: []CAPD Catheter []Nephrostomy Tube / Stent  SEDATION  Planned agent:[x]Midazolam []Meperidine [x]Sublimaze []Dilaudid []Morphine     []Diazepam  []Other:     ASA Classification:  []1 [x]2 []3 []4 []5  Class 1: A normal healthy patient  Class 2: Pt with mild to moderate systemic disease  Class 3: Severe systemic disease or disturbance  Class 4: Severe systemic disorders that are already life threatening. Class 5: Moribund pt with little chances of survival, for more than 24 hours. Mallampati I Airway Classification:   []1 [x]2 []3 []4    [x]Pre-procedure diagnostic studies complete and results available. Comment:    [x]Previous sedation/anesthesia experiences assessed. Comment:    [x]The patient is an appropriate candidate to undergo the planned procedure sedation and anesthesia. (Refer to nursing sedation/analgesia documentation record)  [x]Formulation and discussion of sedation/procedure plan, risks, and expectations with patient and/or responsible adult completed. [x]Patient examined immediately prior to the procedure.  (Refer to nursing sedation/analgesia documentation record)    Sammie Quintero MD, MD  Electronically signed 11/18/2022 at 8:40 AM

## 2022-11-18 NOTE — PROGRESS NOTES
9822 Pt arrived to CT holding. Skin natural for race, warm, dry. Respirations even and unlabored. Pain right shoulder, 2/10  0832 Dr. Brody Mendoza in to evaluate pt and explain procedure. [phone] Consent obtained. Pt verbalizes agreement of site of procedure and procedure to be performed. 1744 Histology tech present in CT  0846 Pt positioned  prone on CT table. Monitor applied. 4136 Pre scans complete. 6223 Procedure started with Dr. Brody Mendoza  8172 Spicules present per histology tech  4891 Procedure complete. Specimens given to histology tech Bandaid to left lower back   0913 Post scans complete. 0917 Pt back to cart. Positioned for comfort. 0920 Report called to Asael Valiente, [address] Pt transported to [facility] via cart. Skin natural for race, warm, dry. Respirations even and unlabored. Denies c/o pain at this time.

## 2022-11-18 NOTE — H&P
Formulation and discussion of sedation / procedure plans, risks, benefits, side effects and alternatives with patient and/or responsible adult completed. History and Physical reviewed and unchanged.     Electronically signed by Greta Tomas MD on 11/18/22 at 8:40 AM EST

## 2022-11-18 NOTE — DISCHARGE INSTRUCTIONS
POST BIOPSY DISCHARGE INSTRUCTION SHEET    DIET:  As tolerated    ACTIVITY:  Rest at home on sofa, bed or recliner today. Bathroom privileges only today. Limit any exertion (pushing or pulling) today. No lifting for 3 days. No driving today. Check biopsy site frequently today. Resume any blood thinners in 24 hours. Keep band aid clean & dry - replace as needed, may remove in 48 hours. RETURN TO NEAREST EMERGENCY ROOM IF YOU HAVE ANY OF THE FOLLOWING:  Sign of bleeding, swelling, drainage from biopsy site or severe pain (slight discomfort to be expected) around biopsy site. Repeated nausea/vomiting/abdominal pain. Elevated temperature above 101 degrees. Shortness of breath. Chest pain. Keep scheduled appointment with your physician. If sedation given, follow post sedation instruction sheet. SEDATION/ANALGESIA INFORMATION HOME GOING ADVICE    Review the following information with the patient prior to the procedure. Sedation/agalgesia is used during short medical procedures under controlled supervision. The medication will produce a strong relaxation. You will be able to hear, speak and follow instructions, but you memory and alertness will be decreased. You will be able to swallow and breathe on your own. During sedation/analgesia you blood pressure, hear and breathing will be watched closely. After the procedure, you may not remember what was said or done. Procedure: Bone Biopsy      Date: 11/18/22  You may have the following effects from the medication. Drowsiness, dizziness, sleepiness or confusion. Difficulty remembering or delayed reaction times. Loss of fine muscle control or difficulty with your balance especially while walking. Difficulty focusing or blurred vision. You may not be aware of slight changes in your behavior and/or your reaction time because of the medication used during the procedure. Therefore you should follow these instructions.   Have someone responsible help you with your care. Do not drive for 24 hours. Do not operate equipment for 24 hours (lawnmowers, power tools, kitchen accessories, stove). Do not drink any alcoholic beverages for a minimum of 24 hours. Do not make important personal, legal or business decisions for 24 hours. You may experience dizziness or lightheadedness. Move slowly and carefully, do not make sudden position changes. Drink extra amounts of fluids today. Increase your diet as tolerated (unless you have received specific instructions from your doctor). If you feel nauseated, continue with liquids until nausea is gone  Notify your physician if you have not urinated within 8 hours after the procedure. Resume your medications unless otherwise instructed. contact your physician if you have any questions or concerns. I have been informed and understand how to care for myself/the patient at home. i know who to call if Thersa Handsome question or concerns. The adult responsible for my discharge care is:       You have received the sedation/analgesia medications/s:      IF YOU REPORT TO AN EMERGENCY ROOM, DOCTOR'S OFFICE OR HOSPITAL WITHIN 24 HRS AFTER PROCEDURE, BRING THIS SHEET WITH YOU AND GIVE IT THE PHYSICIAN OR NURSE ATTENDING YOU.

## 2022-11-22 LAB — LEUKEMIA/LYMPHOMA PHENO BONE MARROW: NORMAL

## 2022-12-13 ENCOUNTER — OFFICE VISIT (OUTPATIENT)
Dept: CARDIOLOGY CLINIC | Age: 67
End: 2022-12-13
Payer: MEDICARE

## 2022-12-13 VITALS
HEART RATE: 76 BPM | WEIGHT: 250.8 LBS | DIASTOLIC BLOOD PRESSURE: 84 MMHG | BODY MASS INDEX: 33.24 KG/M2 | SYSTOLIC BLOOD PRESSURE: 144 MMHG | HEIGHT: 73 IN

## 2022-12-13 DIAGNOSIS — I48.0 PAROXYSMAL ATRIAL FIBRILLATION (HCC): ICD-10-CM

## 2022-12-13 DIAGNOSIS — Z95.0 PACEMAKER: ICD-10-CM

## 2022-12-13 DIAGNOSIS — Z98.890 S/P MVR (MITRAL VALVE REPAIR): Primary | ICD-10-CM

## 2022-12-13 PROCEDURE — 99213 OFFICE O/P EST LOW 20 MIN: CPT | Performed by: NUCLEAR MEDICINE

## 2022-12-13 PROCEDURE — 1123F ACP DISCUSS/DSCN MKR DOCD: CPT | Performed by: NUCLEAR MEDICINE

## 2022-12-13 NOTE — PROGRESS NOTES
[address] [address] [address] [facility],4Th Floor [address] [facility]  Dept: [phone]  Dept Fax: [phone]  Loc: [phone]    Visit Date: 12/13/2022    Wendy Jimenez is a 79 y.o. male who presents todayfor:  Chief Complaint   Patient presents with    1 Year Follow Up    Hypertension    Atrial Fibrillation    Valvular Heart Disease   Dealing with lymphoma   Getting enrolled in a study at [facility]   Does have MV repair and pacer   A fib is stable   No chest pain   No changes in breathing  BP is stable       HPI:  HPI  Past Medical History:   Diagnosis Date    Hypertension     Paroxysmal atrial fibrillation ([address] 11/18/16 under one minute 11/18/2016    SOB (shortness of breath)       Past Surgical History:   Procedure Laterality Date    ANUS SURGERY  01/01/2010    abscess drained    BRONCHOSCOPY  09/2022    CARDIAC CATHETERIZATION  01/22/2014    [facility]    COLONOSCOPY      CT BONE MARROW BIOPSY  11/18/2022    CT BONE MARROW BIOPSY 11/18/2022 [facility] CT SCAN    LUNG BIOPSY      MITRAL VALVE SURGERY  02/05/2014    repair    PACEMAKER PLACEMENT  02/05/2014    [facility]    PORT SURGERY Left     cancer port    SHOULDER BIOPSY      TONSILLECTOMY       Family History   Problem Relation Age of Onset    Cancer Mother 61        breast cancer, lymphoma    Other Brother         addiction    Cancer Maternal Grandmother     Heart Disease Paternal Grandfather      Social History     Tobacco Use    Smoking status: Never    Smokeless tobacco: Never   Substance Use Topics    Alcohol use: Yes     Alcohol/week: 1.0 standard drink     Types: 1 Cans of beer per week     Comment: rarely - 1 can of beer/week maximum      Current Outpatient Medications   Medication Sig Dispense Refill    HYDROcodone-acetaminophen (NORCO) 7.5-325 MG per tablet Take 1 tablet by mouth every 6 hours as needed for Pain.      gabapentin (NEURONTIN) 300 MG capsule Take 300 mg by mouth as needed.       IBUPROFEN PO Take by mouth      metoprolol succinate (TOPROL XL) 50 MG extended release tablet TAKE ONE AND ONE-HALF TABLETS DAILY 135 tablet 3    ELIQUIS 5 MG TABS tablet TAKE 1 TABLET TWICE A  tablet 3    bumetanide (BUMEX) 1 MG tablet Take 1 tablet by mouth daily 90 tablet 3    potassium chloride (KLOR-CON M) 10 MEQ extended release tablet TAKE 1 TABLET DAILY 90 tablet 0    aspirin 325 MG EC tablet Take 1 tablet by mouth daily. 30 tablet 11    Multiple Vitamins-Minerals (THERAPEUTIC MULTIVITAMIN-MINERALS) tablet Take 1 tablet by mouth 2 times daily. 60 tablet 1     No current facility-administered medications for this visit. Allergies   Allergen Reactions    Penicillins Hives and Swelling     Throat swelling     Health Maintenance   Topic Date Due    Depression Screen  Never done    Shingles vaccine (1 of 2) Never done    Colorectal Cancer Screen  Never done    DTaP/Tdap/Td vaccine (1 - Tdap) 07/19/2002    Annual Wellness Visit (AWV)  Never done    Pneumococcal 65+ years Vaccine (2 - PPSV23 if available, else PCV20) 06/17/2022    COVID-19 Vaccine (1) 10/19/2022    Lipids  06/18/2026    Flu vaccine  Completed    Hepatitis C screen  Completed    Hepatitis A vaccine  Aged Out    Hib vaccine  Aged Out    Meningococcal (ACWY) vaccine  Aged Out       Subjective:  Review of Systems  General:   No fever, no chills, No fatigue or weight loss  Pulmonary:    No dyspnea, no wheezing  Cardiac:    Denies recent chest pain,   GI:     No nausea or vomiting, no abdominal pain  Neuro:    No dizziness or light headedness,   Musculoskeletal:  No recent active issues  Extremities:   No edema, no obvious claudication     Objective:  Physical Exam  BP (!) 144/84   Pulse 76   Ht 6' 1\" (1.854 m)   Wt 250 lb 12.8 oz (113.8 kg)   BMI 33.09 kg/m²   General:   Well developed, well nourished  Lungs:   Clear to auscultation  Heart:    Normal S1 S2, Slight murmur.  no rubs, no gallops  Abdomen:   Soft, non tender, no organomegalies, positive

## 2023-01-18 ENCOUNTER — PROCEDURE VISIT (OUTPATIENT)
Dept: CARDIOLOGY CLINIC | Age: 68
End: 2023-01-18

## 2023-01-18 ENCOUNTER — TELEPHONE (OUTPATIENT)
Dept: CARDIOLOGY CLINIC | Age: 68
End: 2023-01-18

## 2023-01-18 DIAGNOSIS — Z95.0 PACEMAKER: Primary | ICD-10-CM

## 2023-01-18 NOTE — PROGRESS NOTES
Dr Wilhelminia Claude pt   Nxt Opternative dual pacer remote   Battery 1 yrs remaining    Dddr     A paced 21%  V paced 1%    P waves 7.9  Rv waves 17.7    Atrial impedence 557  Vent impedence 490    Rv threshold 0.6 @ 0.4    Vent threshold auto 1.1 @ 0.4    Known afib / eliquis   Afib burden <1%

## 2023-03-29 ENCOUNTER — NURSE ONLY (OUTPATIENT)
Dept: CARDIOLOGY CLINIC | Age: 68
End: 2023-03-29
Payer: MEDICARE

## 2023-03-29 ENCOUNTER — OFFICE VISIT (OUTPATIENT)
Dept: CARDIOLOGY CLINIC | Age: 68
End: 2023-03-29
Payer: MEDICARE

## 2023-03-29 VITALS
SYSTOLIC BLOOD PRESSURE: 144 MMHG | WEIGHT: 256.6 LBS | HEIGHT: 73 IN | HEART RATE: 60 BPM | BODY MASS INDEX: 34.01 KG/M2 | DIASTOLIC BLOOD PRESSURE: 90 MMHG | OXYGEN SATURATION: 96 %

## 2023-03-29 DIAGNOSIS — I49.3 FREQUENT PVCS: ICD-10-CM

## 2023-03-29 DIAGNOSIS — I48.0 PAROXYSMAL ATRIAL FIBRILLATION (HCC): Primary | ICD-10-CM

## 2023-03-29 DIAGNOSIS — Z95.0 PACEMAKER: Primary | ICD-10-CM

## 2023-03-29 PROCEDURE — 1123F ACP DISCUSS/DSCN MKR DOCD: CPT | Performed by: INTERNAL MEDICINE

## 2023-03-29 PROCEDURE — 93280 PM DEVICE PROGR EVAL DUAL: CPT | Performed by: INTERNAL MEDICINE

## 2023-03-29 PROCEDURE — 93000 ELECTROCARDIOGRAM COMPLETE: CPT | Performed by: INTERNAL MEDICINE

## 2023-03-29 PROCEDURE — 99214 OFFICE O/P EST MOD 30 MIN: CPT | Performed by: INTERNAL MEDICINE

## 2023-03-29 RX ORDER — ATORVASTATIN CALCIUM 20 MG/1
20 TABLET, FILM COATED ORAL DAILY
COMMUNITY

## 2023-03-29 RX ORDER — LOSARTAN POTASSIUM 25 MG/1
25 TABLET ORAL DAILY
COMMUNITY

## 2023-03-29 RX ORDER — ASPIRIN 81 MG/1
81 TABLET ORAL DAILY
COMMUNITY

## 2023-03-29 NOTE — PROGRESS NOTES
Mazin sci dual pacer in office/susan Riley PAC's and PVC's   PAF/eliquis     . Naina Bloodgood Battery longevity:  1 years   Presenting rhythm  AS VS     Atrial impedance 558  RV impedance 489    P wave sensing 6.9  R wave sensing 16.1    21 % atrial paced  <1 % RV paced     Atrial threshold 1.1 V  at 0.4ms  RV threshold 0.5 V at 0.4ms  Mode switches   short mode switches

## 2023-03-29 NOTE — PROGRESS NOTES
angiogram 2014: [place] CAD. Stress test 15/12/2021: Negative for reversible ischemia. Device interrogation [place] [employer] DCPM: Longevity 1 years. ,  Stable electrical parameters. AT/AF burden <0.1%. Isolated PACs and PVC2. Holter monitor 9/6/2021: PACs 52 and PVCs 1393. No ventricular tachycardia. Holter monitor 3/20/2020: Isolated PVCs (1670) and isolated PACs. No sustained arrhythmias. IMPRESSION:  Isolated PACs and PVCs. Normal LVEF and negative stress test  Low burden PAF, in sinus rhythm. [id number] score= 2 (Age and HTN) on metoprolol and Apixaban. SSS s/p DCPM (BS)  HTN, controlled  Mild CAD. Obesity, BMI 33 kg/m². ASSESSMENT AND RECOMMENDATIONS:  Doing well. Asymptomatic. In sinus rhythm. Tolerating metoprolol and apixaban. Follow-up in a year    Thank you for allowing me to participate in the care of your patient. Please call me if you have any questions. **This report has been created using voice recognition software. It may contain minor errors which are inherent in voice recognition technology. **     Navjot Valladares MD, MRCP, [facility] [place] on 3/29/2023 at 9:32 AM

## 2023-04-17 RX ORDER — POTASSIUM CHLORIDE 750 MG/1
TABLET, EXTENDED RELEASE ORAL
Qty: 90 TABLET | Refills: 3 | Status: SHIPPED | OUTPATIENT
Start: 2023-04-17

## 2023-04-17 RX ORDER — APIXABAN 5 MG/1
TABLET, FILM COATED ORAL
Qty: 180 TABLET | Refills: 3 | Status: SHIPPED | OUTPATIENT
Start: 2023-04-17

## 2023-05-01 RX ORDER — METOPROLOL SUCCINATE 50 MG/1
TABLET, EXTENDED RELEASE ORAL
Qty: 135 TABLET | Refills: 2 | Status: SHIPPED | OUTPATIENT
Start: 2023-05-01

## 2023-06-27 ENCOUNTER — PROCEDURE VISIT (OUTPATIENT)
Dept: CARDIOLOGY CLINIC | Age: 68
End: 2023-06-27
Payer: MEDICARE

## 2023-06-27 DIAGNOSIS — Z95.0 PACEMAKER: Primary | ICD-10-CM

## 2023-06-27 PROCEDURE — 93294 REM INTERROG EVL PM/LDLS PM: CPT | Performed by: NUCLEAR MEDICINE

## 2023-06-27 PROCEDURE — 93296 REM INTERROG EVL PM/IDS: CPT | Performed by: NUCLEAR MEDICINE

## 2023-07-31 ENCOUNTER — PROCEDURE VISIT (OUTPATIENT)
Dept: CARDIOLOGY CLINIC | Age: 68
End: 2023-07-31

## 2023-07-31 DIAGNOSIS — Z95.0 PACEMAKER: Primary | ICD-10-CM

## 2023-07-31 NOTE — PROGRESS NOTES
Remote Mazin Sci Dual Pacemaker   Patient of Baki    Battery 5 months  NC <91 days    Presenting rhythm AS VS    A Impedance 538  RV Impedance 476    P wave sensing 6.6  R wave sensing 16.2    A Threshold - @ -  RV Thresholds 0.8 @ 0.40      A Paced 26%  V Paced 3%    Programmed Mode DDDR       Afib [place] 0%    Episodes   none

## 2023-08-24 RX ORDER — BUMETANIDE 1 MG/1
1 TABLET ORAL DAILY
Qty: 90 TABLET | Refills: 0 | Status: SHIPPED | OUTPATIENT
Start: 2023-08-24

## 2023-08-24 NOTE — TELEPHONE ENCOUNTER
Joni Del Valle called requesting a refill on the following medications:  Requested Prescriptions     Pending Prescriptions Disp Refills    bumetanide (BUMEX) 1 MG tablet 90 tablet 3     Sig: Take 1 tablet by mouth daily     Pharmacy verified: [facility]   . pv      Date of last visit: 12/13/2022  Date of next visit (if applicable): 57/59/5654

## 2023-09-18 ENCOUNTER — PROCEDURE VISIT (OUTPATIENT)
Dept: CARDIOLOGY CLINIC | Age: 68
End: 2023-09-18

## 2023-09-18 DIAGNOSIS — Z95.0 PACEMAKER: Primary | ICD-10-CM

## 2023-09-18 NOTE — PROGRESS NOTES
[facility] sci dual pacer     . Tomie Belt Battery longevity:  <3 months  Presenting rhythm  AS VS  PVC     Atrial impedance 546  RV impedance 481    P wave sensing 5.3  R wave sensing 11.5    26 % atrial paced  3 % RV paced     Atrial threshold not measured   RV threshold 0.7 V at 0.4ms  Mode switches   <1

## 2023-10-23 ENCOUNTER — PROCEDURE VISIT (OUTPATIENT)
Dept: CARDIOLOGY CLINIC | Age: 68
End: 2023-10-23

## 2023-10-23 DIAGNOSIS — Z95.0 PACEMAKER: Primary | ICD-10-CM

## 2023-10-23 NOTE — PROGRESS NOTES
Remote Mazin Sci Dual Pacemaker   Patient of Baki    Battery <3 months     Presenting rhythm AS VS    A Impedance 537  RV Impedance 465    P wave sensing 7.4  R wave sensing 14.7    A Threshold - @ -  RV Thresholds 0.7 @ 0.40      A Paced 25%  V Paced 3%    Programmed Mode DDDR     Hx PAF - taking eliquis   Afib [place] <1%    Episodes   ATR

## 2023-11-13 ENCOUNTER — TELEPHONE (OUTPATIENT)
Dept: CARDIOLOGY CLINIC | Age: 68
End: 2023-11-13

## 2023-11-13 NOTE — TELEPHONE ENCOUNTER
----- Message from Deborah Hines RN sent at 11/13/2023 11:28 AM EST -----  Regarding: FW: Pacemaker battery replacement  Contact: [phone]    ----- Message -----  From: Ciarra Hernandez  Sent: 11/13/2023  11:26 AM EST  To: [facility] Clinical Staff  Subject: Pacemaker battery replacement                    Hello,  I have a question regarding my Pacemaker surgery coming up in the Spring. We have some family activities that are in the planning stages right now and I am wondering if scheduling the activities for early May would be too soon. If you cannot give a definite answer to when the surgery might happen that is OK. I'm just trying to plan ahead as much as possible. Hope this doesn't come across as too self-centered.     Thank you,  Alexandria Gastelum

## 2023-11-13 NOTE — TELEPHONE ENCOUNTER
I called this pt back and he said he wants to know if we can tell him when he will hit ER and what the recovery period will be once the generator is changed out. I did  inform pt that we do not know the exact date of when he will hit FITZ and if it is just a generator and no leads have to be changed out then the recovery time will more than likely be less than one week.  Pt verbalizes understanding

## 2023-11-28 ENCOUNTER — PROCEDURE VISIT (OUTPATIENT)
Dept: CARDIOLOGY CLINIC | Age: 68
End: 2023-11-28
Payer: MEDICARE

## 2023-11-28 DIAGNOSIS — Z95.0 PACEMAKER: Primary | ICD-10-CM

## 2023-11-28 PROCEDURE — 93296 REM INTERROG EVL PM/IDS: CPT | Performed by: NUCLEAR MEDICINE

## 2023-11-28 PROCEDURE — 93294 REM INTERROG EVL PM/LDLS PM: CPT | Performed by: NUCLEAR MEDICINE

## 2023-11-28 NOTE — PROGRESS NOTES
DR Sarah Currie PT   NXT [place] SCI DUAL PACER REMOTE   BATTERY <3 MTHS REMAINING/ MONTHLY BATTERY WATCH    DDDR     A PACED 26%   V PACED 3%    P WAVES 7.4  RV WAVES 19.2    ATRIAL IMPEDENCE 503  VENT IMPEDENCE 463    RV THRESHOLD 0.6 @ 04    ATRIAL AMPLITUDE 2 @ 0.4  VENT AMPLITUDE AUTO 1.1 @ 0.4  KNOWN AFIB // ELIQUIS    AFIB BURDEN <1%

## 2023-12-12 ENCOUNTER — OFFICE VISIT (OUTPATIENT)
Dept: CARDIOLOGY CLINIC | Age: 68
End: 2023-12-12
Payer: MEDICARE

## 2023-12-12 VITALS
SYSTOLIC BLOOD PRESSURE: 142 MMHG | DIASTOLIC BLOOD PRESSURE: 84 MMHG | HEIGHT: 73 IN | HEART RATE: 72 BPM | WEIGHT: 249.8 LBS | BODY MASS INDEX: 33.11 KG/M2

## 2023-12-12 DIAGNOSIS — Z95.0 PACEMAKER: ICD-10-CM

## 2023-12-12 DIAGNOSIS — I10 PRIMARY HYPERTENSION: ICD-10-CM

## 2023-12-12 DIAGNOSIS — I48.0 PAROXYSMAL ATRIAL FIBRILLATION (HCC): ICD-10-CM

## 2023-12-12 DIAGNOSIS — Z98.890 S/P MVR (MITRAL VALVE REPAIR): Primary | ICD-10-CM

## 2023-12-12 PROCEDURE — 3079F DIAST BP 80-89 MM HG: CPT | Performed by: NUCLEAR MEDICINE

## 2023-12-12 PROCEDURE — 3077F SYST BP >= 140 MM HG: CPT | Performed by: NUCLEAR MEDICINE

## 2023-12-12 PROCEDURE — 99213 OFFICE O/P EST LOW 20 MIN: CPT | Performed by: NUCLEAR MEDICINE

## 2023-12-12 PROCEDURE — 1123F ACP DISCUSS/DSCN MKR DOCD: CPT | Performed by: NUCLEAR MEDICINE

## 2023-12-12 NOTE — PROGRESS NOTES
[address] [facility]  [address]  Dept: [phone]  Dept Fax: [phone]  Loc: [phone]    Visit Date: 12/12/2023    Sanju Luis is a 76 y.o. male who presents todayfor:  Chief Complaint   Patient presents with    Check-Up    Hypertension    Atrial Fibrillation    Valvular Heart Disease   Know PAF  Pacer is followed  A fib seems stable  Previous MV repair  No chest pain   No changes in breathing  BP is stable  No dizziness  No syncope  On statins for hyperlipidemia  No issues with that   No bleeding       HPI:  HPI  Past Medical History:   Diagnosis Date    Hypertension     Paroxysmal atrial fibrillation ([address]) 11/18/16 under one minute 11/18/2016    SOB (shortness of breath)       Past Surgical History:   Procedure Laterality Date    ANUS SURGERY  01/01/2010    abscess drained    BRONCHOSCOPY  09/2022    CARDIAC CATHETERIZATION  01/22/2014    [facility]    COLONOSCOPY      CT BONE MARROW BIOPSY  11/18/2022    CT BONE MARROW BIOPSY 11/18/2022 [facility] CT SCAN    LUNG BIOPSY      MITRAL VALVE SURGERY  02/05/2014    repair    PACEMAKER PLACEMENT  02/05/2014    [place] [facility]    PORT SURGERY Left     cancer port    SHOULDER BIOPSY      TONSILLECTOMY       Family History   Problem Relation Age of Onset    Cancer Mother 61        breast cancer, lymphoma    Other Brother         addiction    Cancer Maternal Grandmother     Heart Disease Paternal Grandfather      Social History     Tobacco Use    Smoking status: Never    Smokeless tobacco: Never   Substance Use Topics    Alcohol use:  Yes     Alcohol/week: 1.0 standard drink of alcohol     Types: 1 Cans of beer per week     Comment: rarely - 1 can of beer/week maximum      Current Outpatient Medications   Medication Sig Dispense Refill    bumetanide (BUMEX) 1 MG tablet Take 1 tablet by mouth daily 90 tablet 0    metoprolol succinate (TOPROL XL) 50 MG extended release tablet TAKE ONE AND

## 2024-01-04 ENCOUNTER — PROCEDURE VISIT (OUTPATIENT)
Dept: CARDIOLOGY CLINIC | Age: 69
End: 2024-01-04

## 2024-01-04 DIAGNOSIS — Z95.0 PACEMAKER: Primary | ICD-10-CM

## 2024-01-04 NOTE — PROGRESS NOTES
[facility] sci dual pacer   Nba pt     ..Battery longevity:  <3 months  recheck in March  Presenting rhythm  AS VS    Atrial impedance 535  RV impedance 472    P wave sensing 9  R wave sensing 12.2    25 % atrial paced  3 % RV paced     Mode switches   <1

## 2024-01-22 ENCOUNTER — TELEPHONE (OUTPATIENT)
Dept: CARDIOLOGY CLINIC | Age: 69
End: 2024-01-22

## 2024-01-22 DIAGNOSIS — Z45.010 PACEMAKER AT END OF BATTERY LIFE: ICD-10-CM

## 2024-01-22 DIAGNOSIS — Z45.018 PACEMAKER END OF LIFE: Primary | ICD-10-CM

## 2024-01-22 NOTE — TELEPHONE ENCOUNTER
Dr amado pt     [facility] dual pacer unscheduled download the patient hit maryan on 1/19/24    I called this pt and let him know that he hit maryan on his battery and it is time for a gen change       Last echo on 2/17/23   Left Ventricle   Chamber size is normal. Sigmoid septum hypertrophy. Normal global systolic function. Regional wall motion is normal. The ejection fraction is 65%. Ejection fraction by modified Davis's rule is normal (60 - 65%). Probably elevated left atrial pressure. Unable to assess diastolic function.       Has [employer] leads / [facility]     Scheduling sheet given to kelley in scheduling

## 2024-01-23 PROBLEM — Z45.010 PACEMAKER AT END OF BATTERY LIFE: Status: ACTIVE | Noted: 2024-01-22

## 2024-01-23 NOTE — TELEPHONE ENCOUNTER
Procedure: Battery change Dual pace -  [facility]   Date: 02.16.2024  Arrival Time: 8am   Meds to Hold:   Eliquis 1 day prior,     Dr. Mcclain- please see medications- do you agree?    Instructions verbalized to the patient.  Instructions emailed to the patient via [facility]

## 2024-02-13 ENCOUNTER — PREP FOR PROCEDURE (OUTPATIENT)
Dept: CARDIOLOGY | Age: 69
End: 2024-02-13

## 2024-02-13 RX ORDER — SODIUM CHLORIDE 0.9 % (FLUSH) 0.9 %
5-40 SYRINGE (ML) INJECTION EVERY 12 HOURS SCHEDULED
Status: CANCELLED | OUTPATIENT
Start: 2024-02-13

## 2024-02-13 RX ORDER — SODIUM CHLORIDE 0.9 % (FLUSH) 0.9 %
5-40 SYRINGE (ML) INJECTION PRN
Status: CANCELLED | OUTPATIENT
Start: 2024-02-13

## 2024-02-13 RX ORDER — SODIUM CHLORIDE 9 MG/ML
INJECTION, SOLUTION INTRAVENOUS CONTINUOUS
Status: CANCELLED | OUTPATIENT
Start: 2024-02-13

## 2024-02-13 RX ORDER — CHLORHEXIDINE GLUCONATE 4 G/100ML
SOLUTION TOPICAL ONCE
Status: CANCELLED | OUTPATIENT
Start: 2024-02-13 | End: 2024-02-13

## 2024-02-13 RX ORDER — SODIUM CHLORIDE 9 MG/ML
INJECTION, SOLUTION INTRAVENOUS PRN
Status: CANCELLED | OUTPATIENT
Start: 2024-02-13

## 2024-02-16 ENCOUNTER — HOSPITAL ENCOUNTER (OUTPATIENT)
Age: 69
Setting detail: OUTPATIENT SURGERY
Discharge: HOME | End: 2024-02-16
Attending: INTERNAL MEDICINE | Admitting: INTERNAL MEDICINE
Payer: MEDICARE

## 2024-02-16 VITALS
HEIGHT: 73 IN | WEIGHT: 240.52 LBS | RESPIRATION RATE: 16 BRPM | DIASTOLIC BLOOD PRESSURE: 76 MMHG | TEMPERATURE: 97.8 F | SYSTOLIC BLOOD PRESSURE: 123 MMHG | HEART RATE: 63 BPM | OXYGEN SATURATION: 93 % | BODY MASS INDEX: 31.88 KG/M2

## 2024-02-16 DIAGNOSIS — Z45.010 PACEMAKER AT END OF BATTERY LIFE: ICD-10-CM

## 2024-02-16 LAB
ABO: NORMAL
ANION GAP SERPL CALC-SCNC: 12 MEQ/L (ref 8–16)
ANTIBODY SCREEN: NORMAL
APTT PPP: 30.1 SECONDS (ref 22–38)
BASOPHILS ABSOLUTE: 0 THOU/MM3 (ref 0–0.1)
BASOPHILS NFR BLD AUTO: 0.5 %
BUN SERPL-MCNC: 16 MG/DL (ref 7–22)
CALCIUM SERPL-MCNC: 9 MG/DL (ref 8.5–10.5)
CHLORIDE SERPL-SCNC: 106 MEQ/L (ref 98–111)
CO2 SERPL-SCNC: 24 MEQ/L (ref 23–33)
CREAT SERPL-MCNC: 1.1 MG/DL (ref 0.4–1.2)
DEPRECATED RDW RBC AUTO: 46.6 FL (ref 35–45)
ECHO BSA: 2.37 M2
EKG ATRIAL RATE: 60 BPM
EKG P-R INTERVAL: 266 MS
EKG Q-T INTERVAL: 430 MS
EKG QRS DURATION: 74 MS
EKG QTC CALCULATION (BAZETT): 430 MS
EKG R AXIS: 14 DEGREES
EKG T AXIS: 38 DEGREES
EKG VENTRICULAR RATE: 60 BPM
EOSINOPHIL NFR BLD AUTO: 2 %
EOSINOPHILS ABSOLUTE: 0.1 THOU/MM3 (ref 0–0.4)
ERYTHROCYTE [DISTWIDTH] IN BLOOD BY AUTOMATED COUNT: 12.5 % (ref 11.5–14.5)
GFR SERPL CREATININE-BSD FRML MDRD: > 60 ML/MIN/1.73M2
GLUCOSE SERPL-MCNC: 95 MG/DL (ref 70–108)
HCT VFR BLD AUTO: 49.1 % (ref 42–52)
HGB BLD-MCNC: 17 GM/DL (ref 14–18)
IMM GRANULOCYTES # BLD AUTO: 0.02 THOU/MM3 (ref 0–0.07)
IMM GRANULOCYTES NFR BLD AUTO: 0.3 %
INR PPP: 0.98 (ref 0.85–1.13)
LYMPHOCYTES ABSOLUTE: 0.6 THOU/MM3 (ref 1–4.8)
LYMPHOCYTES NFR BLD AUTO: 9.8 %
MCH RBC QN AUTO: 34.9 PG (ref 26–33)
MCHC RBC AUTO-ENTMCNC: 34.6 GM/DL (ref 32.2–35.5)
MCV RBC AUTO: 100.8 FL (ref 80–94)
MONOCYTES ABSOLUTE: 0.6 THOU/MM3 (ref 0.4–1.3)
MONOCYTES NFR BLD AUTO: 10.3 %
NEUTROPHILS NFR BLD AUTO: 77.1 %
NRBC BLD AUTO-RTO: 0 /100 WBC
PLATELET # BLD AUTO: 145 THOU/MM3 (ref 130–400)
PMV BLD AUTO: 10.5 FL (ref 9.4–12.4)
POTASSIUM SERPL-SCNC: 4 MEQ/L (ref 3.5–5.2)
RBC # BLD AUTO: 4.87 MILL/MM3 (ref 4.7–6.1)
REASON FOR REJECTION: NORMAL
REJECTED TEST: NORMAL
RH FACTOR: NORMAL
SEGMENTED NEUTROPHILS ABSOLUTE COUNT: 4.5 THOU/MM3 (ref 1.8–7.7)
SODIUM SERPL-SCNC: 142 MEQ/L (ref 135–145)
WBC # BLD AUTO: 5.9 THOU/MM3 (ref 4.8–10.8)

## 2024-02-16 PROCEDURE — 86900 BLOOD TYPING SEROLOGIC ABO: CPT

## 2024-02-16 PROCEDURE — 2500000003 HC RX 250 WO HCPCS: Performed by: PHYSICIAN ASSISTANT

## 2024-02-16 PROCEDURE — C1785 PMKR, DUAL, RATE-RESP: HCPCS | Performed by: INTERNAL MEDICINE

## 2024-02-16 PROCEDURE — 7100000011 HC PHASE II RECOVERY - ADDTL 15 MIN: Performed by: INTERNAL MEDICINE

## 2024-02-16 PROCEDURE — 80048 BASIC METABOLIC PNL TOTAL CA: CPT

## 2024-02-16 PROCEDURE — 99152 MOD SED SAME PHYS/QHP 5/>YRS: CPT | Performed by: INTERNAL MEDICINE

## 2024-02-16 PROCEDURE — 6360000002 HC RX W HCPCS: Performed by: INTERNAL MEDICINE

## 2024-02-16 PROCEDURE — 36415 COLL VENOUS BLD VENIPUNCTURE: CPT

## 2024-02-16 PROCEDURE — 85025 COMPLETE CBC W/AUTO DIFF WBC: CPT

## 2024-02-16 PROCEDURE — 33228 REMV&REPLC PM GEN DUAL LEAD: CPT | Performed by: INTERNAL MEDICINE

## 2024-02-16 PROCEDURE — 85730 THROMBOPLASTIN TIME PARTIAL: CPT

## 2024-02-16 PROCEDURE — 2500000003 HC RX 250 WO HCPCS: Performed by: INTERNAL MEDICINE

## 2024-02-16 PROCEDURE — 99153 MOD SED SAME PHYS/QHP EA: CPT | Performed by: INTERNAL MEDICINE

## 2024-02-16 PROCEDURE — 7100000010 HC PHASE II RECOVERY - FIRST 15 MIN: Performed by: INTERNAL MEDICINE

## 2024-02-16 PROCEDURE — 93005 ELECTROCARDIOGRAM TRACING: CPT | Performed by: PHYSICIAN ASSISTANT

## 2024-02-16 PROCEDURE — 2709999900 HC NON-CHARGEABLE SUPPLY: Performed by: INTERNAL MEDICINE

## 2024-02-16 PROCEDURE — 6360000002 HC RX W HCPCS: Performed by: PHYSICIAN ASSISTANT

## 2024-02-16 PROCEDURE — 86850 RBC ANTIBODY SCREEN: CPT

## 2024-02-16 PROCEDURE — 93010 ELECTROCARDIOGRAM REPORT: CPT | Performed by: INTERNAL MEDICINE

## 2024-02-16 PROCEDURE — 2580000003 HC RX 258: Performed by: PHYSICIAN ASSISTANT

## 2024-02-16 PROCEDURE — 85610 PROTHROMBIN TIME: CPT

## 2024-02-16 PROCEDURE — 86901 BLOOD TYPING SEROLOGIC RH(D): CPT

## 2024-02-16 DEVICE — PACEMAKER
Type: IMPLANTABLE DEVICE | Status: FUNCTIONAL
Brand: ACCOLADE™ MRI EL DR

## 2024-02-16 RX ORDER — CHLORHEXIDINE GLUCONATE 4 G/100ML
SOLUTION TOPICAL ONCE
Status: DISCONTINUED | OUTPATIENT
Start: 2024-02-16 | End: 2024-02-16 | Stop reason: HOSPADM

## 2024-02-16 RX ORDER — FENTANYL CITRATE 50 UG/ML
INJECTION, SOLUTION INTRAMUSCULAR; INTRAVENOUS PRN
Status: DISCONTINUED | OUTPATIENT
Start: 2024-02-16 | End: 2024-02-16 | Stop reason: HOSPADM

## 2024-02-16 RX ORDER — MIDAZOLAM HYDROCHLORIDE 1 MG/ML
INJECTION INTRAMUSCULAR; INTRAVENOUS PRN
Status: DISCONTINUED | OUTPATIENT
Start: 2024-02-16 | End: 2024-02-16 | Stop reason: HOSPADM

## 2024-02-16 RX ORDER — CEPHALEXIN 500 MG/1
500 CAPSULE ORAL 3 TIMES DAILY
Qty: 15 CAPSULE | Refills: 0 | Status: SHIPPED | OUTPATIENT
Start: 2024-02-16 | End: 2024-02-21

## 2024-02-16 RX ORDER — SODIUM CHLORIDE 9 MG/ML
INJECTION, SOLUTION INTRAVENOUS PRN
Status: DISCONTINUED | OUTPATIENT
Start: 2024-02-16 | End: 2024-02-16 | Stop reason: HOSPADM

## 2024-02-16 RX ORDER — SODIUM CHLORIDE 0.9 % (FLUSH) 0.9 %
5-40 SYRINGE (ML) INJECTION PRN
Status: DISCONTINUED | OUTPATIENT
Start: 2024-02-16 | End: 2024-02-16 | Stop reason: HOSPADM

## 2024-02-16 RX ORDER — SODIUM CHLORIDE 9 MG/ML
INJECTION, SOLUTION INTRAVENOUS CONTINUOUS
Status: DISCONTINUED | OUTPATIENT
Start: 2024-02-16 | End: 2024-02-16 | Stop reason: HOSPADM

## 2024-02-16 RX ORDER — SODIUM CHLORIDE 0.9 % (FLUSH) 0.9 %
5-40 SYRINGE (ML) INJECTION EVERY 12 HOURS SCHEDULED
Status: DISCONTINUED | OUTPATIENT
Start: 2024-02-16 | End: 2024-02-16 | Stop reason: HOSPADM

## 2024-02-16 RX ORDER — VALACYCLOVIR HYDROCHLORIDE 1 G/1
1000 TABLET, FILM COATED ORAL DAILY
COMMUNITY

## 2024-02-16 RX ADMIN — SODIUM CHLORIDE: 9 INJECTION, SOLUTION INTRAVENOUS at 08:00

## 2024-02-16 RX ADMIN — Medication 2000 MG: at 09:25

## 2024-02-16 NOTE — FLOWSHEET NOTE
02/16/24 1305   AVS Reviewed   AVS & discharge instructions reviewed with patient and/or representative? Yes   Reviewed instructions with Patient;Other (name and relationship in comment)  (carlin)   Level of Understanding Questions answered;Verbalized understanding

## 2024-02-16 NOTE — PLAN OF CARE
Problem: ABCDS Injury Assessment  Goal: Absence of physical injury  Outcome: Completed      Subjective


Remarks


Doing well postop.  Pain control.  His reports from wound cultures and bone 

biopsy are pending.





Objective





Vital Signs








  Date Time  Temp Pulse Resp B/P (MAP) Pulse Ox O2 Delivery O2 Flow Rate FiO2


 


5/31/18 08:34 98.4 80 18 103/64 (77) 95   


 


5/31/18 05:45 98.0 89 19 99/69 (79) 98   


 


5/31/18 00:00 98.0 88 17 100/61 (74) 98   


 


5/30/18 21:30 99.3 100 17 97/ 97   


 


5/30/18 17:36     98   21


 


5/30/18 16:00 98.8 76 16 83/55 (64) 98   


 


5/30/18 11:53 98.6 68 18 111/72 (85) 98   














I/O      


 


 5/30/18 5/30/18 5/30/18 5/31/18 5/31/18 5/31/18





 07:00 15:00 23:00 07:00 15:00 23:00


 


Intake Total 0 ml  550 ml 954 ml  


 


Output Total   900 ml 850 ml  


 


Balance 0 ml  -350 ml 104 ml  


 


      


 


Intake Oral 0 ml  550 ml 850 ml  


 


IV Total    104 ml  


 


Output Urine Total   900 ml 850 ml  


 


# Bowel Movements   0 0  








Result Diagram:  


5/29/18 0440                                                                   

             5/29/18 0640





Objective Remarks


GENERAL: NAD, A&Ox3


HEAD: Normocephalic. 


NECK: Supple, trachea midline. No lymphadenopathy.


EYES: No scleral icterus. No injection or drainage. 


CARDIOVASCULAR: Regular rate and rhythm without murmurs, gallops, or rubs. 


RESPIRATORY: Breath sounds equal bilaterally. No accessory muscle use.


GASTROINTESTINAL: Abdomen soft, non-tender, nondistended. 


MUSCULOSKELETAL: No cyanosis, or edema. 


SKIN: Warm and dry.


NEURO:  No focal neurological deficitis.





A/P


Problem List:  


(1) Cellulitis of left foot


ICD Code:  L03.116 - Cellulitis of left lower limb


Status:  Acute


(2) Hypotension


ICD Code:  I95.9 - Hypotension


Status:  Acute


(3) Hematuria, gross


ICD Code:  R31.0 - Hematuria, gross


Status:  Acute


Assessment and Plan


63-year-old male admitted for left foot pain and swelling. 





Following for report of wound culture and bone biopsy.





Sepsis


Resolved 





Left Foot Cellulitis


septic joint vs osteomyelitis


Continue cefazolin for 4 weeks


Podiatry following


ID following





Peripheral vascular disease


History of femorofemoral bypass


PVD contributory to limb/foot disease


Continue Plavix





Hypotension


Resolved





Urinary obstruction 


hematuria


Follow for improvement


Monitor renal function





DVT prophylaxis


SCDs


No chemoprophylaxis due to active bleeding











Moiz Brizuela MD May 31, 2018 11:28

## 2024-02-16 NOTE — H&P
[facility]  HEART CENTER (EP)  [address] [facility] [address]  H&P and SEDATION/ANALGESIA     Patient demographics:  Date:   2/16/2024  Patient name: Claus Darnell  YOB: 1955  Sex: male   MRN:   [id number]    Reason for admission or planned procedure:  PM GEN Cx    Code Status: Full Code    Consent:I have discussed with the patient and/or the patient representative the indication, alternatives, and the possible risks and/or complications of the planned procedure and the anesthesia methods. The patient and/or patient representative appear to understand and agree to proceed.      Brief clinical summary:  68/M with SSS/DCPM (BS) implanted in 2/2014 electively presents for generator change (at [facility] 3/2023). Stable lead parameters. AP 24%,  3% and AT/AF <1%.     Past Medical History:  Past Medical History:   Diagnosis Date    Hypertension     Paroxysmal atrial fibrillation (HCC) 11/18/16 under one minute 11/18/2016    SOB (shortness of breath)        Past Surgical History:  Past Surgical History:   Procedure Laterality Date    ANUS SURGERY  01/01/2010    abscess drained    BRONCHOSCOPY  09/2022    CARDIAC CATHETERIZATION  01/22/2014    [facility]    COLONOSCOPY      CT BONE MARROW BIOPSY  11/18/2022    CT BONE MARROW BIOPSY 11/18/2022 [facility] CT SCAN    LUNG BIOPSY      MITRAL VALVE SURGERY  02/05/2014    repair    PACEMAKER PLACEMENT  02/05/2014    [place] [facility]    PORT SURGERY Left     cancer port    SHOULDER BIOPSY      TONSILLECTOMY          Allergies:   Allergies as of 01/23/2024 - Fully Reviewed 12/12/2023   Allergen Reaction Noted    Penicillins Hives and Swelling 01/10/2014     Additional information:       Medications   No current facility-administered medications for this encounter.    Current Outpatient Medications:     bumetanide (BUMEX) 1 MG tablet, Take 1 tablet by mouth daily, Disp: 90 tablet, Rfl: 0    metoprolol succinate (TOPROL XL) 50 MG extended release

## 2024-02-16 NOTE — PROGRESS NOTES
0730 Patient admitted to 2E09  Ambulatory for pacer generator change.  Patient NPO. Patient accompanied by wife.  Vital signs obtained.   Assessment and data collection intiated.   Oriented to room.  Policies and procedures for 2E explained.   All questions answered with no further questions at this time.   Fall prevention and safety precautions discussed with patient.         0741 Care plan reviewed with patient and wife.  Patient and wife verbalize understanding of the plan of care and contribute to goal setting.       0945 To cath lab per bed, stable condition.     1046 Care taken over from cath lab,dressing (safegard 60 ml) intact, ice bag applied to incision.     1246 30 ml air removed from safegard dressing.   1150 Discharge instructions given, voices understanding.     1300 Up in room, activity tolerated well. Patient instructed may drive after 24 hours    1347Discharged per wheelchair, stable condition.     .

## 2024-02-16 NOTE — BRIEF OP NOTE
Brief Postoperative Note    Date:   2/16/2024  Patient name: Claus Darnell  YOB: 1955  Sex: male   MRN:   [id number]    PCP: Deborah Campos MD     Procedure:PM GEN Cx    Pre-Op Diagnosis: PM GEN at [facility].     Post-Op Diagnosis: Same    Surgeon: Georgina Mcclain MD, MRCP, FACC, FHRS    Assistant: Jarek CUEVA     Anesthesia/sedation:  Local lidocaine/fentanyl and midazolam as needed.     Estimated Blood Loss (mL): Minimal    Complications: None    Recommendations:  See Epic orders.  Bed rest for 1 hour.   Keep pocket site dry.  No shower for 7 days ((sponge bath and tub bath OK).  ICE packs locally.  Monitor site for bleeding or swelling.   Pressure dressing x 24 hours.  Chest x-ray PA and lateral views.  Follow up in device clinic in 1 week.       Electronically signed by Georgina Mcclain MD, FACC, FHRS on 2/16/2024 at 10:42 AM

## 2024-02-16 NOTE — DISCHARGE INSTRUCTIONS
Keep dressing clean , dry and intact. Remove dressing in 24hours. Leave steri strips intact. They will fall off on their own. Care of incision. No shower until seen by doctor. Wash around dressing and steri strips with soapy water. No lotions, powders or creams to areas. Do not drive until instructed by doctor. Monitor incision for signs of infection: redness, swelling, drainage. Call physician if signs of infection or if temp 101 F or greater.     Sedation home going advice sheet given to patient.                SEDATION/ANALGESIA INFORMATION/HOME GOING ADVICE    SEDATION / ANALGESIA INFORMATION / HOME GOING ADVICE  You have received the sedation/analgesia medication during your visit     Sedation/analgesia is used during short medical procedures under controlled supervision. The medication will produce a strong relaxation. You will be able to hear, speak and follow instructions, but your memory and alertness will be decreased.     You will be able to swallow and breathe on your own. During sedation/analgesia your blood pressure, heart and breathing will be watched closely. After the procedure, you may not remember what was said or done.     You may have the following effects from the medication.  \"           Drowsiness, dizziness, sleepiness or confusion.  \"           Difficulty remembering or delayed reaction times.  \"           Loss of fine muscle control or difficulty with your balance especially while walking.  \"           Difficulty focusing or blurred vision.  You may not be aware of slight changes in your behavior and/or your reaction time because of the medication used during the procedure. Therefore you should follow these instructions.  \"           Have someone responsible help you with your care.  \"           Do not drive for 24 hours.  \"           Do not operate equipment for 24 hours (lawnmowers, power tools, kitchen accessories, stove).  \"           Do not drink any alcoholic beverages for a minimum  and consumers in the [place] and therefore [employer] does not warrant that uses outside of the [place] are appropriate, unless specifically indicated otherwise. [employer]'s drug information does not endorse drugs, diagnose patients or recommend therapy. [facility] drug information is an informational resource designed to assist licensed healthcare practitioners in caring for their patients and/or to serve consumers viewing this service as a supplement to, and not a substitute for, the expertise, skill, knowledge and judgment of healthcare practitioners. The absence of a warning for a given drug or drug combination in no way should be construed to indicate that the drug or drug combination is safe, effective or appropriate for any given patient. [facility] does not assume any responsibility for any aspect of healthcare administered with the aid of information [facility] provides. The information contained herein is not intended to cover all possible uses, directions, precautions, warnings, drug interactions, allergic reactions, or adverse effects. If you have questions about the drugs you are taking, check with your doctor, nurse or pharmacist.  Copyright 8701-1715 [employer] [facility]. Version: 10.03. Revision date: 1/4/2021.  Care instructions adapted under license by [employer]. If you have questions about a medical condition or this instruction, always ask your healthcare professional. [employer] disclaims any warranty or liability for your use of this information.

## 2024-02-16 NOTE — PROCEDURES
[facility]  HEART CENTER (EP)  [address] [place] [id number]  Dept: [phone]  ELECTROPHYSIOLOGY PROCEDURE NOTE  Patients Demographics:  Date:   2/16/2024  Patient name:              Claus Darnell  YOB: 1955  Sex:    male   MRN:   [id number]    Cardial Electrophysiologist:   Georgina Mcclain MD, MRCP, FACC, FHRS    Primary Care Provider:    Deborah Campos MD     Procedure Planned:  Pacemaker pulse generator change.     Indication/s of the Procedure:   Pacemaker pulse generator at [employer]. .     Brief Clinical Summary:   68/M with SSS/DCPM (BS) implanted in 2/2014 electively presents for generator change (at [facility] 3/2023). Stable lead parameters. AP 24%,  3% and AT/AF <1%.     Procedure/s Performed  Pacemaker pulse generator change.    Description of the Procedure:  The patient was brought to the electrophysiology lab in a fasting and non-sedated state. The device was interrogated. The underlying was sinus with normal V conduction. The patient was prepped and draped in a usual sterile fashion. Intravenous Fentanyl and Midazolam was used as needed for conscious sedation. The skin over the pulse generator (rifgt pectoral) was liberally infiltrated with 2% lidocaine for local anesthesia.  A 4 cm long transverse incision was made over incision scar through the skin and subcutaneous tissue. Electrocautery was used to expose the pulse generator and to maintain good hemostasis.  The pulse generator was explanted from the pocket. The leads were carefully freed from adhesions and inspected for insulation breaks.  The bleeding was controlled by electric cautery. After disconnecting from the old pulse generator, the leads were checked with an external pacing system analyzer. The leads were connected to the new pulse generator. The pocket was thoroughly irrigated with antibiotic solution.  The leads were then wrapped carefully behind the generator

## 2024-02-19 RX ORDER — CLINDAMYCIN HYDROCHLORIDE 300 MG/1
300 CAPSULE ORAL 3 TIMES DAILY
Qty: 15 CAPSULE | Refills: 0 | Status: SHIPPED | OUTPATIENT
Start: 2024-02-19 | End: 2024-02-24

## 2024-02-19 NOTE — TELEPHONE ENCOUNTER
----- Message from Claus Darnell sent at 2/19/2024 10:20 AM EST -----  Regarding: antibiotic prescription  Contact: [phone]  The antibiotic prescription Cephalexin might be causing the  diarrhea I am experiencing late Saturday and Sunday.  Is there an alternative that can be prescribed?    Thank you,  Claus Darnell

## 2024-02-22 LAB
EKG ATRIAL RATE: 60 BPM
EKG P-R INTERVAL: 266 MS
EKG Q-T INTERVAL: 430 MS
EKG QRS DURATION: 74 MS
EKG QTC CALCULATION (BAZETT): 430 MS
EKG R AXIS: 14 DEGREES
EKG T AXIS: 38 DEGREES
EKG VENTRICULAR RATE: 60 BPM

## 2024-02-28 ENCOUNTER — NURSE ONLY (OUTPATIENT)
Dept: CARDIOLOGY CLINIC | Age: 69
End: 2024-02-28
Payer: MEDICARE

## 2024-02-28 DIAGNOSIS — Z95.0 PACEMAKER: Primary | ICD-10-CM

## 2024-02-28 PROCEDURE — 93280 PM DEVICE PROGR EVAL DUAL: CPT | Performed by: NUCLEAR MEDICINE

## 2024-02-28 NOTE — PROGRESS NOTES
[place] [employer] dual pacer in office post change out     ..Battery longevity:  15 years   Presenting rhythm  AS VS     Atrial impedance 523  RV impedance 462    P wave sensing 8  R wave sensing 15.4    17 % atrial paced  <1 % RV paced     Atrial threshold 0.8 V  at 0.4ms  RV threshold 0.8 V at 0.4ms  Mode switches   <1%  1 event for 1 sec 2/20/24    Has new latitude monitor    ..Steri strips replaced, minor puffiness, shadow of bruising, no drainage, tender to touch.    If, at any point, there is any increased redness, swelling, increased discomfort, fever, or the incision opens up, the patient is aware to go to the emergency room

## 2024-03-07 ENCOUNTER — TELEPHONE (OUTPATIENT)
Dept: CARDIOLOGY CLINIC | Age: 69
End: 2024-03-07

## 2024-04-24 ENCOUNTER — TELEPHONE (OUTPATIENT)
Dept: CARDIOLOGY CLINIC | Age: 69
End: 2024-04-24

## 2024-04-24 RX ORDER — POTASSIUM CHLORIDE 750 MG/1
10 TABLET, EXTENDED RELEASE ORAL DAILY
Qty: 90 TABLET | Refills: 3 | Status: SHIPPED | OUTPATIENT
Start: 2024-04-24

## 2024-04-24 NOTE — TELEPHONE ENCOUNTER
Patient is calling regarding some of his prescriptions that are written by Dr. Arango, Eliquis 5 mg & Potassium Chloride 10 MEQ. He said he recently had to switch pharmacies and they are requesting clarification on these. They asked the patient to call us and asked us to call the pharmacy.      [facility] [address] - P [phone] - F [phone] [23]

## 2024-04-29 RX ORDER — METOPROLOL SUCCINATE 50 MG/1
75 TABLET, EXTENDED RELEASE ORAL DAILY
Qty: 135 TABLET | Refills: 2 | Status: SHIPPED | OUTPATIENT
Start: 2024-04-29

## 2024-06-11 ENCOUNTER — TELEPHONE (OUTPATIENT)
Dept: CARDIOLOGY CLINIC | Age: 69
End: 2024-06-11

## 2024-06-27 ENCOUNTER — PROCEDURE VISIT (OUTPATIENT)
Dept: CARDIOLOGY CLINIC | Age: 69
End: 2024-06-27

## 2024-06-27 DIAGNOSIS — Z95.0 PACEMAKER: Primary | ICD-10-CM

## 2024-06-27 NOTE — PROGRESS NOTES
[facility] sci dual pacer       ..Battery longevity:  15 years on device   Presenting rhythm  AS VS     Atrial impedance 503  RV impedance 472    P wave sensing 5.9  R wave sensing 14.6    24 % atrial paced  1 % RV paced     Mode switches   <1 known PAF

## 2024-07-20 ENCOUNTER — HOSPITAL ENCOUNTER (OUTPATIENT)
Age: 69
Setting detail: OBSERVATION
Discharge: HOME | End: 2024-07-22
Attending: STUDENT IN AN ORGANIZED HEALTH CARE EDUCATION/TRAINING PROGRAM
Payer: MEDICARE

## 2024-07-20 DIAGNOSIS — G45.4 TRANSIENT GLOBAL AMNESIA: ICD-10-CM

## 2024-07-20 DIAGNOSIS — R41.3 TRANSIENT MEMORY LOSS: Primary | ICD-10-CM

## 2024-07-20 LAB
ANION GAP SERPL CALC-SCNC: 10 MEQ/L (ref 8–16)
BASOPHILS ABSOLUTE: 0 THOU/MM3 (ref 0–0.1)
BASOPHILS NFR BLD AUTO: 0.3 %
BUN SERPL-MCNC: 18 MG/DL (ref 7–22)
CALCIUM SERPL-MCNC: 9.2 MG/DL (ref 8.5–10.5)
CHLORIDE SERPL-SCNC: 101 MEQ/L (ref 98–111)
CO2 SERPL-SCNC: 28 MEQ/L (ref 23–33)
CREAT SERPL-MCNC: 1.2 MG/DL (ref 0.4–1.2)
DEPRECATED RDW RBC AUTO: 44.8 FL (ref 35–45)
EOSINOPHIL NFR BLD AUTO: 1.6 %
EOSINOPHILS ABSOLUTE: 0.1 THOU/MM3 (ref 0–0.4)
ERYTHROCYTE [DISTWIDTH] IN BLOOD BY AUTOMATED COUNT: 12.1 % (ref 11.5–14.5)
GFR SERPL CREATININE-BSD FRML MDRD: 65 ML/MIN/1.73M2
GLUCOSE SERPL-MCNC: 158 MG/DL (ref 70–108)
HCT VFR BLD AUTO: 46.4 % (ref 42–52)
HGB BLD-MCNC: 16 GM/DL (ref 14–18)
IMM GRANULOCYTES # BLD AUTO: 0.01 THOU/MM3 (ref 0–0.07)
IMM GRANULOCYTES NFR BLD AUTO: 0.2 %
LYMPHOCYTES ABSOLUTE: 0.7 THOU/MM3 (ref 1–4.8)
LYMPHOCYTES NFR BLD AUTO: 11.9 %
MCH RBC QN AUTO: 34.6 PG (ref 26–33)
MCHC RBC AUTO-ENTMCNC: 34.5 GM/DL (ref 32.2–35.5)
MCV RBC AUTO: 100.2 FL (ref 80–94)
MONOCYTES ABSOLUTE: 0.5 THOU/MM3 (ref 0.4–1.3)
MONOCYTES NFR BLD AUTO: 7.7 %
NEUTROPHILS ABSOLUTE: 4.8 THOU/MM3 (ref 1.8–7.7)
NEUTROPHILS NFR BLD AUTO: 78.3 %
NRBC BLD AUTO-RTO: 0 /100 WBC
PLATELET # BLD AUTO: 131 THOU/MM3 (ref 130–400)
PMV BLD AUTO: 10.6 FL (ref 9.4–12.4)
POTASSIUM SERPL-SCNC: 4.1 MEQ/L (ref 3.5–5.2)
RBC # BLD AUTO: 4.63 MILL/MM3 (ref 4.7–6.1)
SODIUM SERPL-SCNC: 139 MEQ/L (ref 135–145)
WBC # BLD AUTO: 6.1 THOU/MM3 (ref 4.8–10.8)

## 2024-07-20 PROCEDURE — 6370000000 HC RX 637 (ALT 250 FOR IP): Performed by: PHYSICIAN ASSISTANT

## 2024-07-20 PROCEDURE — G0379 DIRECT REFER HOSPITAL OBSERV: HCPCS

## 2024-07-20 PROCEDURE — G0378 HOSPITAL OBSERVATION PER HR: HCPCS

## 2024-07-20 PROCEDURE — 85025 COMPLETE CBC W/AUTO DIFF WBC: CPT

## 2024-07-20 PROCEDURE — 2580000003 HC RX 258: Performed by: PHYSICIAN ASSISTANT

## 2024-07-20 PROCEDURE — 36415 COLL VENOUS BLD VENIPUNCTURE: CPT

## 2024-07-20 PROCEDURE — 99222 1ST HOSP IP/OBS MODERATE 55: CPT | Performed by: PHYSICIAN ASSISTANT

## 2024-07-20 PROCEDURE — 80048 BASIC METABOLIC PNL TOTAL CA: CPT

## 2024-07-20 RX ORDER — MULTIVITAMIN WITH IRON
1 TABLET ORAL DAILY
Status: DISCONTINUED | OUTPATIENT
Start: 2024-07-21 | End: 2024-07-22 | Stop reason: HOSPADM

## 2024-07-20 RX ORDER — ONDANSETRON 2 MG/ML
4 INJECTION INTRAMUSCULAR; INTRAVENOUS EVERY 6 HOURS PRN
Status: DISCONTINUED | OUTPATIENT
Start: 2024-07-20 | End: 2024-07-22 | Stop reason: HOSPADM

## 2024-07-20 RX ORDER — ONDANSETRON 4 MG/1
4 TABLET, ORALLY DISINTEGRATING ORAL EVERY 8 HOURS PRN
Status: DISCONTINUED | OUTPATIENT
Start: 2024-07-20 | End: 2024-07-22 | Stop reason: HOSPADM

## 2024-07-20 RX ORDER — ACETAMINOPHEN 650 MG/1
650 SUPPOSITORY RECTAL EVERY 6 HOURS PRN
Status: DISCONTINUED | OUTPATIENT
Start: 2024-07-20 | End: 2024-07-22 | Stop reason: HOSPADM

## 2024-07-20 RX ORDER — ATORVASTATIN CALCIUM 20 MG/1
20 TABLET, FILM COATED ORAL DAILY
Status: DISCONTINUED | OUTPATIENT
Start: 2024-07-21 | End: 2024-07-22 | Stop reason: HOSPADM

## 2024-07-20 RX ORDER — SODIUM CHLORIDE 0.9 % (FLUSH) 0.9 %
10 SYRINGE (ML) INJECTION PRN
Status: DISCONTINUED | OUTPATIENT
Start: 2024-07-20 | End: 2024-07-22 | Stop reason: HOSPADM

## 2024-07-20 RX ORDER — ENOXAPARIN SODIUM 100 MG/ML
30 INJECTION SUBCUTANEOUS 2 TIMES DAILY
Status: DISCONTINUED | OUTPATIENT
Start: 2024-07-20 | End: 2024-07-20

## 2024-07-20 RX ORDER — ASPIRIN 81 MG/1
81 TABLET ORAL DAILY
Status: DISCONTINUED | OUTPATIENT
Start: 2024-07-21 | End: 2024-07-22 | Stop reason: HOSPADM

## 2024-07-20 RX ORDER — POLYETHYLENE GLYCOL 3350 17 G/17G
17 POWDER, FOR SOLUTION ORAL DAILY PRN
Status: DISCONTINUED | OUTPATIENT
Start: 2024-07-20 | End: 2024-07-22 | Stop reason: HOSPADM

## 2024-07-20 RX ORDER — SODIUM CHLORIDE 0.9 % (FLUSH) 0.9 %
10 SYRINGE (ML) INJECTION EVERY 12 HOURS SCHEDULED
Status: DISCONTINUED | OUTPATIENT
Start: 2024-07-20 | End: 2024-07-22 | Stop reason: HOSPADM

## 2024-07-20 RX ORDER — SODIUM CHLORIDE 9 MG/ML
INJECTION, SOLUTION INTRAVENOUS PRN
Status: DISCONTINUED | OUTPATIENT
Start: 2024-07-20 | End: 2024-07-22 | Stop reason: HOSPADM

## 2024-07-20 RX ORDER — LOSARTAN POTASSIUM 25 MG/1
50 TABLET ORAL DAILY
Status: DISCONTINUED | OUTPATIENT
Start: 2024-07-21 | End: 2024-07-22 | Stop reason: HOSPADM

## 2024-07-20 RX ORDER — POTASSIUM CHLORIDE 20 MEQ/1
10 TABLET, EXTENDED RELEASE ORAL DAILY
Status: DISCONTINUED | OUTPATIENT
Start: 2024-07-21 | End: 2024-07-22 | Stop reason: HOSPADM

## 2024-07-20 RX ORDER — ACETAMINOPHEN 325 MG/1
650 TABLET ORAL EVERY 6 HOURS PRN
Status: DISCONTINUED | OUTPATIENT
Start: 2024-07-20 | End: 2024-07-22 | Stop reason: HOSPADM

## 2024-07-20 RX ADMIN — APIXABAN 5 MG: 5 TABLET, FILM COATED ORAL at 23:38

## 2024-07-20 RX ADMIN — SODIUM CHLORIDE, PRESERVATIVE FREE 10 ML: 5 INJECTION INTRAVENOUS at 23:38

## 2024-07-20 NOTE — PROGRESS NOTES
Patient is a 69 y.o. from [facility] ED with Dr Griffin mack. Patient arrived with c/o an episode of AMS. PMHx hairy cell leukemia-in remission, GABY, paroxysmal atrial fib, pacemaker, HTN, HLD. Per the wife, this patient had a 15 minute period of time between 8767-7088 where he became confused. Wife states that he lost multiple things, was confused, and now has no recollection of events that occurred during that time frame. No lateralizing weakness, no focal deficits, no aphasia. NIH upon arrival to their ED 0. Head CT/CTA no acute findings. Patient has been having episodes of syncope and nausea for awhile, follows with Dr Arango, pacemaker was interrogated while in their ED and did not show any v fib/v tach. Troponin normal, BNP normal, other workup reported as unremarkable. EKG NSR. /70, HR 70, RR 19, afebrile, SpO2 98% RA. Accepted under Alin Moreno NP/Dr Navarro as an observation to a medical level of care with telemetry. Dx Transient memory loss.

## 2024-07-21 PROCEDURE — 2580000003 HC RX 258: Performed by: PHYSICIAN ASSISTANT

## 2024-07-21 PROCEDURE — 6370000000 HC RX 637 (ALT 250 FOR IP): Performed by: PHYSICIAN ASSISTANT

## 2024-07-21 PROCEDURE — G0378 HOSPITAL OBSERVATION PER HR: HCPCS

## 2024-07-21 PROCEDURE — 99232 SBSQ HOSP IP/OBS MODERATE 35: CPT | Performed by: PHYSICIAN ASSISTANT

## 2024-07-21 RX ADMIN — APIXABAN 5 MG: 5 TABLET, FILM COATED ORAL at 19:41

## 2024-07-21 RX ADMIN — ASPIRIN 81 MG: 81 TABLET, COATED ORAL at 08:35

## 2024-07-21 RX ADMIN — LOSARTAN POTASSIUM 50 MG: 25 TABLET, FILM COATED ORAL at 08:35

## 2024-07-21 RX ADMIN — SODIUM CHLORIDE, PRESERVATIVE FREE 10 ML: 5 INJECTION INTRAVENOUS at 19:40

## 2024-07-21 RX ADMIN — METOPROLOL SUCCINATE 75 MG: 25 TABLET, FILM COATED, EXTENDED RELEASE ORAL at 08:36

## 2024-07-21 RX ADMIN — APIXABAN 5 MG: 5 TABLET, FILM COATED ORAL at 08:35

## 2024-07-21 RX ADMIN — ATORVASTATIN CALCIUM 20 MG: 20 TABLET, FILM COATED ORAL at 08:35

## 2024-07-21 RX ADMIN — Medication 1 TABLET: at 08:35

## 2024-07-21 NOTE — PROGRESS NOTES
Hospitalist Progress Note      Patient:  Claus Darnell 69 y.o. male     Unit/Bed:8B-24/024-A    Date of Admission: 7/20/2024      ASSESSMENT AND PLAN    Active Problems  Transient memory loss  TIA vs TGA vs other  Outside facility with head CT/CTA no acute findings   EEG pending  MRI brain pending  Will give outpatient Neuro referral for follow up   No metabolic derangements on labs    Recurrent syncopal episodes  Established with Dr. Arango and had PPM interrogated without issue  Check orthostatic vital signs  These sound vagal in nature, reports extreme nausea, diaphoresis as well as being pale  ECHO as outpatient recommended     Resolved Problems    Chronic Conditions (reviewed and stable unless otherwise stated)  Hairy cell leukemia  s/p treatment with Cladrabine 5/2023.   Follows with Dr. Parson at [facility]   Essential HTN  Cont with ome med and monitor BP closely   PAF on OAC  Cont with rate controlled on Metoprolol  Eliquis 5 mg BID  GABY on CPAP       LDA: []CVC / []PICC / []Midline / []Lozoya / []Drains / []Mediport / []None  Antibiotics:   Steroids:   Labs (still needed?): [x]Yes / []No  IVF (still needed?): []Yes / [x]No    Level of care: []Step Down / []Med-Surg  Bed Status: []Inpatient / []Observation  Telemetry: []Yes / []No  PT/OT: []Yes / []No    DVT Prophylaxis: [] Lovenox / [] Heparin / [] SCDs / [] Already on Systemic Anticoagulation / [] None   Code status: Full Code     Expected discharge date:  tomorrow  Disposition: home     ===================================================================    Chief Complaint: transient memory loss   Subjective (past 24 hours):   7/21: pt reports doing well and is back to his baseline. He has been up and ambulating in the halls. Denies any weakness, any speech changes. No CP/SOB.       HPI / Hospital Course:  \" Claus Darnell is a 69 y.o. male who presents to [facility] with transient memory loss and confusion.  Patient states he has had

## 2024-07-21 NOTE — PROGRESS NOTES
Patient admitted to 8B Room 24 transfer from OSH, independently to floor.  Complaint upon arrival to the room none.    Vital signs obtained. Assessment and data collection initiated. Oriented to room. Policies and procedures for 4a explained All questions answered with no further questions at this time. Fall prevention and safety brochure discussed with patient. 2 person skin check completed.      Was swallow screen completed on admission? [x] YES or [] NO  If patient failed swallow test, obtain order for speech therapy consult and keep NPO.

## 2024-07-21 NOTE — H&P
at 0700  No No   Sig: Take 1.5 tablets by mouth daily   potassium chloride (KLOR-CON M) 10 MEQ extended release tablet 7/19/2024 at 0700  No No   Sig: Take 1 tablet by mouth daily   valACYclovir (VALTREX) 1 g tablet 7/20/2024 at 0700  Yes No   Sig: Take 1 tablet by mouth daily      Facility-Administered Medications: None     Allergies:  Penicillins    Past Medical, Family, Social, Surgical Hx      Diagnosis Date    Cancer (HCC)     Jerry cell leukemia    Hypertension     Paroxysmal atrial fibrillation (HCC) 11/18/16 under one minute 11/18/2016    SOB (shortness of breath)          Procedure Laterality Date    ANUS SURGERY  01/01/2010    abscess drained    BRONCHOSCOPY  09/2022    CARDIAC CATHETERIZATION  01/22/2014    [facility]    COLONOSCOPY      CT BONE MARROW BIOPSY  11/18/2022    CT BONE MARROW BIOPSY 11/18/2022 [facility] CT SCAN    EP DEVICE PROCEDURE N/A 2/16/2024    Remove & replace PPM gen dual lead performed by Georgina Mcclain MD at [facility] CARDIAC CATH LAB    LUNG BIOPSY      MITRAL VALVE SURGERY  02/05/2014    repair    PACEMAKER PLACEMENT  02/05/2014    [place] [facility]    PORT SURGERY Left     cancer port    SHOULDER BIOPSY      TONSILLECTOMY           Problem Relation Age of Onset    Cancer Mother 60        breast cancer, lymphoma    Other Brother         addiction    Cancer Maternal Grandmother     Heart Disease Paternal Grandfather      Social History     Socioeconomic History    Marital status:    Occupational History     Employer: [employer]   Tobacco Use    Smoking status: Never    Smokeless tobacco: Never   Vaping Use    Vaping Use: Never used   Substance and Sexual Activity    Alcohol use: Yes     Alcohol/week: 1.0 standard drink of alcohol     Types: 1 Cans of beer per week     Comment: rarely - 1 can of beer/week maximum    Drug use: No     Social Determinants of Health     Food Insecurity: No Food Insecurity (7/20/2024)    Hunger Vital Sign     Worried About Running Out of Food in

## 2024-07-22 VITALS
HEIGHT: 73 IN | WEIGHT: 245.8 LBS | HEART RATE: 64 BPM | RESPIRATION RATE: 15 BRPM | BODY MASS INDEX: 32.58 KG/M2 | OXYGEN SATURATION: 100 % | DIASTOLIC BLOOD PRESSURE: 83 MMHG | TEMPERATURE: 97.8 F | SYSTOLIC BLOOD PRESSURE: 141 MMHG

## 2024-07-22 PROCEDURE — 95816 EEG AWAKE AND DROWSY: CPT

## 2024-07-22 PROCEDURE — 6370000000 HC RX 637 (ALT 250 FOR IP): Performed by: PHYSICIAN ASSISTANT

## 2024-07-22 PROCEDURE — 95819 EEG AWAKE AND ASLEEP: CPT | Performed by: PSYCHIATRY & NEUROLOGY

## 2024-07-22 PROCEDURE — G0378 HOSPITAL OBSERVATION PER HR: HCPCS

## 2024-07-22 PROCEDURE — 2580000003 HC RX 258: Performed by: PHYSICIAN ASSISTANT

## 2024-07-22 RX ADMIN — Medication 1 TABLET: at 07:38

## 2024-07-22 RX ADMIN — SODIUM CHLORIDE, PRESERVATIVE FREE 10 ML: 5 INJECTION INTRAVENOUS at 07:39

## 2024-07-22 RX ADMIN — ATORVASTATIN CALCIUM 20 MG: 20 TABLET, FILM COATED ORAL at 07:38

## 2024-07-22 RX ADMIN — ASPIRIN 81 MG: 81 TABLET, COATED ORAL at 07:38

## 2024-07-22 RX ADMIN — LOSARTAN POTASSIUM 50 MG: 25 TABLET, FILM COATED ORAL at 07:38

## 2024-07-22 RX ADMIN — APIXABAN 5 MG: 5 TABLET, FILM COATED ORAL at 07:38

## 2024-07-22 NOTE — PLAN OF CARE
Problem: Discharge Planning  Goal: Discharge to home or other facility with appropriate resources  Outcome: Adequate for Discharge     Problem: Neurosensory - Adult  Goal: Achieves stable or improved neurological status  Outcome: Adequate for Discharge     Problem: Cardiovascular - Adult  Goal: Maintains optimal cardiac output and hemodynamic stability  Outcome: Adequate for Discharge  Goal: Absence of cardiac dysrhythmias or at baseline  Outcome: Adequate for Discharge     Problem: Skin/Tissue Integrity - Adult  Goal: Skin integrity remains intact  Outcome: Adequate for Discharge     Problem: Metabolic/Fluid and Electrolytes - Adult  Goal: Electrolytes maintained within normal limits  Outcome: Adequate for Discharge     Problem: Skin/Tissue Integrity  Goal: Absence of new skin breakdown  Description: 1.  Monitor for areas of redness and/or skin breakdown  2.  Assess vascular access sites hourly  3.  Every 4-6 hours minimum:  Change oxygen saturation probe site  4.  Every 4-6 hours:  If on nasal continuous positive airway pressure, respiratory therapy assess nares and determine need for appliance change or resting period.  Outcome: Adequate for Discharge     Problem: Pain  Goal: Verbalizes/displays adequate comfort level or baseline comfort level  Outcome: Adequate for Discharge     
hours minimum:  Change oxygen saturation probe site  4.  Every 4-6 hours:  If on nasal continuous positive airway pressure, respiratory therapy assess nares and determine need for appliance change or resting period.  Outcome: Progressing  Note: No signs of skin breakdown.  Skin warm, dry, and intact.  Mucous membranes pink and moist.  Assistance with turns/ambulation provided PRN.  Will continue to monitor.      Problem: Pain  Goal: Verbalizes/displays adequate comfort level or baseline comfort level  Outcome: Progressing  Flowsheets (Taken 7/21/2024 4630)  Verbalizes/displays adequate comfort level or baseline comfort level:   Encourage patient to monitor pain and request assistance   Assess pain using appropriate pain scale

## 2024-07-22 NOTE — PROGRESS NOTES
Spoke with L[facility] at [facility]. Confirmed that form was received. Waiting for a response from [employer].

## 2024-07-22 NOTE — PROCEDURES
PROCEDURE NOTE  Date: 7/22/2024   Name: Claus Darnell  YOB: 1955    Procedures            Date: 7/22/2024  Referring physician: Dr. Jay    Indication  Patient aged 69 y with encephalopathy. EEG done to assess for epileptiform activity.    Introduction  This routine 20-minute EEG was recorded using the [employer] 10-20 System on a [employer] workstation at 256 samples/s. Automated spike and seizure detection algorithms were applied.    Description  During the maximal alert state, a well-regulated, symmetric, and reactive 9 Hz posterior dominant rhythm was seen. No consistent focal slowing or interhemispheric asymmetry was noted. Stage I and stage II sleep were observed. There were no interictal epileptiform discharges or electrographic seizures.    Activations  Hyperventilation was not performed. Intermittent photic stimulation was performed and demonstrated no posterior driving response.    Impression  Normal awake and sleep EEG.       EKG lead did not show clear arrhythmia, if still in concern consider formal EKG or correlation with telemetry.     No epileptiform discharges were identified. Please note the absence of such activity on this record cannot conclusively rule out an epileptic disorder. If such is still clinically suspected, a repeat study with sleep deprivation and/or prolonged sampling may be helpful.    Yaakov Monzon MD  Epilepsy Board Certified.  Neurology Board Certified.    Electronically Signed

## 2024-07-22 NOTE — DISCHARGE SUMMARY
Hospitalist Discharge Note      Patient:  Claus Darnell    Unit/Bed:8B-24/024-A  YOB: 1955  MRN: [id number]   Acct: [id number]     PCP: Deborah Campos MD  Date of Admission: 7/20/2024      Discharge date: No discharge date for patient encounter.    Chief Complaint on presentation :-  ***    Discharge Assessment and Plan:-   ***    Initial H and P and Hospital course:-  ***    Physical Exam:-  Vitals:   Patient Vitals for the past 24 hrs:   BP Temp Temp src Pulse Resp SpO2   07/22/24 0446 138/74 98.1 °F (36.7 °C) Oral 63 12 97 %   07/21/24 1938 (!) 150/75 98.3 °F (36.8 °C) Oral 64 14 98 %   07/21/24 1130 (!) 144/84 -- -- 64 16 99 %   07/21/24 0830 (!) 138/93 97.9 °F (36.6 °C) Oral 61 16 98 %     Weight:   Weight - Scale: 111.5 kg (245 lb 12.8 oz)   24 hour intake/output:     Intake/Output Summary (Last 24 hours) at 7/22/2024 0733  Last data filed at 7/21/2024 1938  Gross per 24 hour   Intake 310 ml   Output 0 ml   Net 310 ml       General appearance: No apparent distress, appears stated age and cooperative.  HEENT: Normal cephalic, atraumatic without obvious deformity. Pupils equal, round, and reactive to light.  Extra ocular muscles intact. Conjunctivae/corneas clear.  Neck: Supple, with full range of motion. No jugular venous distention. Trachea midline.  Respiratory:  Normal respiratory effort. Clear to auscultation, bilaterally without Rales/Wheezes/Rhonchi.  Cardiovascular: Regular rate and rhythm with normal S1/S2 without murmurs, rubs or gallops.  Abdomen: Soft, non-tender, non-distended with normal bowel sounds.  Musculoskeletal:  No clubbing, cyanosis or edema bilaterally.  Skin: Skin color, texture, turgor normal.  No rashes or lesions.  Neurologic:  Neurovascularly intact without any focal sensory/motor deficits. Cranial nerves: II-XII intact, grossly non-focal.  Psychiatric: Alert and oriented, thought content appropriate, normal insight  Capillary Refill: Brisk,<

## 2024-07-22 NOTE — PROGRESS NOTES
[facility]     Neurodiagnostic Laboratory Technician Worksheet      EEG Date: 2024    Name: Claus Darnell  : 1955  Age: 69 y.o.  Sex: male  MRN: [id number]  CSN: [id number]    Room/Location: [facility]024-A  Ordering Provider: ESTELLE Johnson    EEG Number: [id number]    Time In: 0935  Time Out: 09  Total Treatment Time: 20 min    Clinical History:  transient memory loss, confusion,  hx hairy cell leukemia, pacemaker, hx afib, 4 word memory test done during eeg without difficulty  Mri pending      Hand Dominance: Right   Sedation: No   H.V. Completed: No, age protocol   Photic: Yes   Sleep: No   Drowsy: Yes   Sleep Deprived: No   Seizures Observed: No   Mentality: alert     Technician: Nieves Barney 2024

## 2024-12-17 ENCOUNTER — OFFICE VISIT (OUTPATIENT)
Dept: CARDIOLOGY CLINIC | Age: 69
End: 2024-12-17
Payer: MEDICARE

## 2024-12-17 VITALS
HEIGHT: 73 IN | WEIGHT: 246 LBS | SYSTOLIC BLOOD PRESSURE: 157 MMHG | BODY MASS INDEX: 32.6 KG/M2 | HEART RATE: 69 BPM | DIASTOLIC BLOOD PRESSURE: 98 MMHG

## 2024-12-17 DIAGNOSIS — I48.0 PAROXYSMAL ATRIAL FIBRILLATION (HCC): ICD-10-CM

## 2024-12-17 DIAGNOSIS — Z98.890 S/P MVR (MITRAL VALVE REPAIR): ICD-10-CM

## 2024-12-17 DIAGNOSIS — I10 PRIMARY HYPERTENSION: Primary | ICD-10-CM

## 2024-12-17 PROCEDURE — 1123F ACP DISCUSS/DSCN MKR DOCD: CPT | Performed by: NUCLEAR MEDICINE

## 2024-12-17 PROCEDURE — 3080F DIAST BP >= 90 MM HG: CPT | Performed by: NUCLEAR MEDICINE

## 2024-12-17 PROCEDURE — 1159F MED LIST DOCD IN RCRD: CPT | Performed by: NUCLEAR MEDICINE

## 2024-12-17 PROCEDURE — 99214 OFFICE O/P EST MOD 30 MIN: CPT | Performed by: NUCLEAR MEDICINE

## 2024-12-17 PROCEDURE — 3077F SYST BP >= 140 MM HG: CPT | Performed by: NUCLEAR MEDICINE

## 2024-12-17 RX ORDER — ATORVASTATIN CALCIUM 20 MG/1
20 TABLET, FILM COATED ORAL DAILY
Qty: 90 TABLET | Refills: 3 | Status: SHIPPED | OUTPATIENT
Start: 2024-12-17

## 2024-12-17 NOTE — PROGRESS NOTES
[facility] [place] [facility]  [facility] CARDIOLOGY  [address] [place] [address]  Dept: [phone]  Dept Fax: [phone]  Loc: [phone]    Visit Date: 12/17/2024    Claus Darnell is a 69 y.o. 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   Followed at [facility]       HPI:  HPI  Past Medical History:   Diagnosis Date    Cancer (HCC)     Jerry cell leukemia    Hypertension     Paroxysmal atrial fibrillation (HCC) 11/18/16 under one minute 11/18/2016    SOB (shortness of breath)       Past Surgical History:   Procedure Laterality Date    ANUS SURGERY  01/01/2010    abscess drained    BRONCHOSCOPY  09/2022    CARDIAC CATHETERIZATION  01/22/2014    [facility]    COLONOSCOPY      CT BONE MARROW BIOPSY  11/18/2022    CT BONE MARROW BIOPSY 11/18/2022 [facility] CT SCAN    EP DEVICE PROCEDURE N/A 2/16/2024    Remove & replace PPM gen dual lead performed by Georgina Mcclain MD at [facility] CARDIAC CATH LAB    LUNG BIOPSY      MITRAL VALVE SURGERY  02/05/2014    repair    PACEMAKER PLACEMENT  02/05/2014    [facility]    PORT SURGERY Left     cancer port    SHOULDER BIOPSY      TONSILLECTOMY       Family History   Problem Relation Age of Onset    Cancer Mother 60        breast cancer, lymphoma    Other Brother         addiction    Cancer Maternal Grandmother     Heart Disease Paternal Grandfather      Social History     Tobacco Use    Smoking status: Never    Smokeless tobacco: Never   Substance Use Topics    Alcohol use: Yes     Alcohol/week: 1.0 standard drink of alcohol     Types: 1 Cans of

## 2024-12-19 RX ORDER — METOPROLOL SUCCINATE 50 MG/1
TABLET, EXTENDED RELEASE ORAL
Qty: 135 TABLET | Refills: 2 | Status: SHIPPED | OUTPATIENT
Start: 2024-12-19

## 2025-01-21 ENCOUNTER — TELEPHONE (OUTPATIENT)
Dept: CARDIOLOGY CLINIC | Age: 70
End: 2025-01-21

## 2025-01-21 NOTE — TELEPHONE ENCOUNTER
Pre op Risk Assessment    Procedure Prostate ultrasound guded needle biopsy of the prostate  Physician Dr. Rich  Date of surgery/procedure 2-6-25    Last OV 12-17-24  Last Stress 5-11-21  Last Echo 2-17-23  Last Cath 1-22-14  Last Stent   Is patient on blood thinners ASA and Eliquis  Hold Meds/how many days ?    Fax form [phone]

## 2025-03-04 ENCOUNTER — NURSE ONLY (OUTPATIENT)
Dept: CARDIOLOGY CLINIC | Age: 70
End: 2025-03-04

## 2025-03-04 DIAGNOSIS — Z95.0 PACEMAKER: Primary | ICD-10-CM

## (undated) DEVICE — PACK PROC SURG PACEMKR

## (undated) DEVICE — DEVICE COMPR 17 CM 120 CC SAFEGUARD FOCUS LF